# Patient Record
Sex: MALE | Race: WHITE | NOT HISPANIC OR LATINO | URBAN - METROPOLITAN AREA
[De-identification: names, ages, dates, MRNs, and addresses within clinical notes are randomized per-mention and may not be internally consistent; named-entity substitution may affect disease eponyms.]

---

## 2017-01-02 ENCOUNTER — OFFICE VISIT - RIVER FALLS (OUTPATIENT)
Dept: FAMILY MEDICINE | Facility: CLINIC | Age: 82
End: 2017-01-02

## 2017-01-02 ASSESSMENT — MIFFLIN-ST. JEOR: SCORE: 1572.34

## 2017-01-16 ENCOUNTER — OFFICE VISIT - RIVER FALLS (OUTPATIENT)
Dept: FAMILY MEDICINE | Facility: CLINIC | Age: 82
End: 2017-01-16

## 2017-01-16 ASSESSMENT — MIFFLIN-ST. JEOR: SCORE: 1559.64

## 2017-02-28 ENCOUNTER — AMBULATORY - HEALTHEAST (OUTPATIENT)
Dept: CARDIOLOGY | Facility: CLINIC | Age: 82
End: 2017-02-28

## 2017-02-28 DIAGNOSIS — Z95.0 PACEMAKER: ICD-10-CM

## 2017-03-13 ENCOUNTER — OFFICE VISIT - RIVER FALLS (OUTPATIENT)
Dept: FAMILY MEDICINE | Facility: CLINIC | Age: 82
End: 2017-03-13

## 2017-03-21 ENCOUNTER — OFFICE VISIT - RIVER FALLS (OUTPATIENT)
Dept: FAMILY MEDICINE | Facility: CLINIC | Age: 82
End: 2017-03-21

## 2017-04-24 ENCOUNTER — OFFICE VISIT - RIVER FALLS (OUTPATIENT)
Dept: FAMILY MEDICINE | Facility: CLINIC | Age: 82
End: 2017-04-24

## 2017-05-08 ENCOUNTER — OFFICE VISIT - RIVER FALLS (OUTPATIENT)
Dept: FAMILY MEDICINE | Facility: CLINIC | Age: 82
End: 2017-05-08

## 2017-05-22 ENCOUNTER — OFFICE VISIT - RIVER FALLS (OUTPATIENT)
Dept: FAMILY MEDICINE | Facility: CLINIC | Age: 82
End: 2017-05-22

## 2017-06-05 ENCOUNTER — AMBULATORY - HEALTHEAST (OUTPATIENT)
Dept: CARDIOLOGY | Facility: CLINIC | Age: 82
End: 2017-06-05

## 2017-06-05 ENCOUNTER — AMBULATORY - RIVER FALLS (OUTPATIENT)
Dept: FAMILY MEDICINE | Facility: CLINIC | Age: 82
End: 2017-06-05

## 2017-06-05 LAB — HBA1C MFR BLD: 5.7 %

## 2017-06-07 ENCOUNTER — AMBULATORY - RIVER FALLS (OUTPATIENT)
Dept: FAMILY MEDICINE | Facility: CLINIC | Age: 82
End: 2017-06-07

## 2017-06-07 ENCOUNTER — COMMUNICATION - RIVER FALLS (OUTPATIENT)
Dept: FAMILY MEDICINE | Facility: CLINIC | Age: 82
End: 2017-06-07

## 2017-06-08 ENCOUNTER — AMBULATORY - HEALTHEAST (OUTPATIENT)
Dept: CARDIOLOGY | Facility: CLINIC | Age: 82
End: 2017-06-08

## 2017-06-08 ENCOUNTER — OFFICE VISIT - HEALTHEAST (OUTPATIENT)
Dept: CARDIOLOGY | Facility: CLINIC | Age: 82
End: 2017-06-08

## 2017-06-08 DIAGNOSIS — I25.10 CORONARY ARTERY DISEASE INVOLVING NATIVE CORONARY ARTERY OF NATIVE HEART WITHOUT ANGINA PECTORIS: ICD-10-CM

## 2017-06-08 DIAGNOSIS — I48.21 PERMANENT ATRIAL FIBRILLATION (H): ICD-10-CM

## 2017-06-08 DIAGNOSIS — I48.0 PAROXYSMAL ATRIAL FIBRILLATION (H): ICD-10-CM

## 2017-06-08 DIAGNOSIS — Z95.0 CARDIAC PACEMAKER IN SITU: ICD-10-CM

## 2017-06-08 LAB
CHOLEST SERPL-MCNC: 176 MG/DL (ref 125–200)
CHOLEST/HDLC SERPL: 4.9 {RATIO}
CREAT SERPL-MCNC: 1.26 MG/DL (ref 0.7–1.11)
GLUCOSE BLD-MCNC: 104 MG/DL (ref 65–99)
HCC DEVICE COMMENTS: NORMAL
HDLC SERPL-MCNC: 36 MG/DL
LDLC SERPL CALC-MCNC: 108 MG/DL
NONHDLC SERPL-MCNC: 140 MG/DL
TRIGL SERPL-MCNC: 161 MG/DL

## 2017-06-08 ASSESSMENT — MIFFLIN-ST. JEOR: SCORE: 1568.04

## 2017-06-19 ENCOUNTER — OFFICE VISIT - RIVER FALLS (OUTPATIENT)
Dept: FAMILY MEDICINE | Facility: CLINIC | Age: 82
End: 2017-06-19

## 2017-06-19 ASSESSMENT — MIFFLIN-ST. JEOR: SCORE: 1562.36

## 2017-07-18 ENCOUNTER — OFFICE VISIT - RIVER FALLS (OUTPATIENT)
Dept: FAMILY MEDICINE | Facility: CLINIC | Age: 82
End: 2017-07-18

## 2017-07-18 ASSESSMENT — MIFFLIN-ST. JEOR: SCORE: 1555.1

## 2017-07-24 ENCOUNTER — OFFICE VISIT - RIVER FALLS (OUTPATIENT)
Dept: FAMILY MEDICINE | Facility: CLINIC | Age: 82
End: 2017-07-24

## 2017-07-24 ASSESSMENT — MIFFLIN-ST. JEOR: SCORE: 1565.08

## 2017-08-01 ENCOUNTER — OFFICE VISIT - RIVER FALLS (OUTPATIENT)
Dept: FAMILY MEDICINE | Facility: CLINIC | Age: 82
End: 2017-08-01

## 2017-08-01 ASSESSMENT — MIFFLIN-ST. JEOR: SCORE: 1540.58

## 2017-08-02 ENCOUNTER — COMMUNICATION - RIVER FALLS (OUTPATIENT)
Dept: FAMILY MEDICINE | Facility: CLINIC | Age: 82
End: 2017-08-02

## 2017-08-29 ENCOUNTER — AMBULATORY - HEALTHEAST (OUTPATIENT)
Dept: CARDIOLOGY | Facility: CLINIC | Age: 82
End: 2017-08-29

## 2017-09-11 ENCOUNTER — AMBULATORY - HEALTHEAST (OUTPATIENT)
Dept: CARDIOLOGY | Facility: CLINIC | Age: 82
End: 2017-09-11

## 2017-09-11 DIAGNOSIS — Z95.0 PACEMAKER: ICD-10-CM

## 2017-09-12 LAB — HCC DEVICE COMMENTS: NORMAL

## 2017-09-25 ENCOUNTER — COMMUNICATION - RIVER FALLS (OUTPATIENT)
Dept: FAMILY MEDICINE | Facility: CLINIC | Age: 82
End: 2017-09-25

## 2017-11-02 ENCOUNTER — OFFICE VISIT - RIVER FALLS (OUTPATIENT)
Dept: FAMILY MEDICINE | Facility: CLINIC | Age: 82
End: 2017-11-02

## 2017-11-02 ASSESSMENT — MIFFLIN-ST. JEOR: SCORE: 1554.19

## 2017-11-03 LAB
CHOLEST SERPL-MCNC: 168 MG/DL
CHOLEST/HDLC SERPL: 4.8 {RATIO}
CREAT SERPL-MCNC: 1.19 MG/DL (ref 0.7–1.11)
GLUCOSE BLD-MCNC: 170 MG/DL (ref 65–99)
HBA1C MFR BLD: 5.5 %
HDLC SERPL-MCNC: 35 MG/DL
LDLC SERPL CALC-MCNC: 97 MG/DL
NONHDLC SERPL-MCNC: 133 MG/DL
TRIGL SERPL-MCNC: 236 MG/DL

## 2017-11-20 ENCOUNTER — COMMUNICATION - RIVER FALLS (OUTPATIENT)
Dept: FAMILY MEDICINE | Facility: CLINIC | Age: 82
End: 2017-11-20

## 2017-11-21 ENCOUNTER — OFFICE VISIT - RIVER FALLS (OUTPATIENT)
Dept: FAMILY MEDICINE | Facility: CLINIC | Age: 82
End: 2017-11-21

## 2017-11-21 ASSESSMENT — MIFFLIN-ST. JEOR: SCORE: 1554.19

## 2017-12-18 ENCOUNTER — AMBULATORY - HEALTHEAST (OUTPATIENT)
Dept: CARDIOLOGY | Facility: CLINIC | Age: 82
End: 2017-12-18

## 2017-12-18 DIAGNOSIS — Z95.0 PACEMAKER: ICD-10-CM

## 2017-12-19 LAB — HCC DEVICE COMMENTS: NORMAL

## 2017-12-21 ENCOUNTER — OFFICE VISIT - RIVER FALLS (OUTPATIENT)
Dept: FAMILY MEDICINE | Facility: CLINIC | Age: 82
End: 2017-12-21

## 2018-01-01 ENCOUNTER — OFFICE VISIT - RIVER FALLS (OUTPATIENT)
Dept: FAMILY MEDICINE | Facility: CLINIC | Age: 83
End: 2018-01-01

## 2018-01-01 ENCOUNTER — AMBULATORY - HEALTHEAST (OUTPATIENT)
Dept: CARDIOLOGY | Facility: CLINIC | Age: 83
End: 2018-01-01

## 2018-01-01 DIAGNOSIS — Z95.0 CARDIAC PACEMAKER IN SITU: ICD-10-CM

## 2018-01-01 LAB
CREAT SERPL-MCNC: 1.2 MG/DL (ref 0.7–1.11)
GLUCOSE BLD-MCNC: 126 MG/DL (ref 65–99)
HBA1C MFR BLD: 5.6 %
HCC DEVICE COMMENTS: NORMAL
HCC DEVICE IMPLANTING PROVIDER: NORMAL
HCC DEVICE MANUFACTURE: NORMAL
HCC DEVICE MODEL: NORMAL
HCC DEVICE SERIAL NUMBER: NORMAL
HCC DEVICE TYPE: NORMAL

## 2018-01-01 ASSESSMENT — MIFFLIN-ST. JEOR: SCORE: 1491.6

## 2018-02-26 ENCOUNTER — AMBULATORY - RIVER FALLS (OUTPATIENT)
Dept: FAMILY MEDICINE | Facility: CLINIC | Age: 83
End: 2018-02-26

## 2018-03-27 ENCOUNTER — AMBULATORY - HEALTHEAST (OUTPATIENT)
Dept: CARDIOLOGY | Facility: CLINIC | Age: 83
End: 2018-03-27

## 2018-03-27 DIAGNOSIS — Z95.0 PACEMAKER: ICD-10-CM

## 2018-03-27 LAB — HCC DEVICE COMMENTS: NORMAL

## 2018-06-29 ENCOUNTER — OFFICE VISIT - HEALTHEAST (OUTPATIENT)
Dept: CARDIOLOGY | Facility: CLINIC | Age: 83
End: 2018-06-29

## 2018-06-29 ENCOUNTER — AMBULATORY - HEALTHEAST (OUTPATIENT)
Dept: CARDIOLOGY | Facility: CLINIC | Age: 83
End: 2018-06-29

## 2018-06-29 DIAGNOSIS — I48.21 PERMANENT ATRIAL FIBRILLATION (H): ICD-10-CM

## 2018-06-29 DIAGNOSIS — Z95.0 CARDIAC PACEMAKER IN SITU: ICD-10-CM

## 2018-06-29 DIAGNOSIS — I25.10 CORONARY ARTERY DISEASE INVOLVING NATIVE CORONARY ARTERY OF NATIVE HEART WITHOUT ANGINA PECTORIS: ICD-10-CM

## 2018-06-29 LAB
HCC DEVICE COMMENTS: NORMAL
HCC DEVICE IMPLANTING PROVIDER: NORMAL
HCC DEVICE MANUFACTURE: NORMAL
HCC DEVICE MODEL: NORMAL
HCC DEVICE SERIAL NUMBER: NORMAL
HCC DEVICE TYPE: NORMAL

## 2018-06-29 RX ORDER — TAMSULOSIN HYDROCHLORIDE 0.4 MG/1
0.4 CAPSULE ORAL
Status: SHIPPED | COMMUNITY
Start: 2018-06-29

## 2019-01-01 ENCOUNTER — COMMUNICATION - RIVER FALLS (OUTPATIENT)
Dept: FAMILY MEDICINE | Facility: CLINIC | Age: 84
End: 2019-01-01

## 2019-01-01 ENCOUNTER — AMBULATORY - HEALTHEAST (OUTPATIENT)
Dept: CARDIOLOGY | Facility: CLINIC | Age: 84
End: 2019-01-01

## 2019-01-01 ENCOUNTER — OFFICE VISIT - HEALTHEAST (OUTPATIENT)
Dept: CARDIOLOGY | Facility: CLINIC | Age: 84
End: 2019-01-01

## 2019-01-01 ENCOUNTER — OFFICE VISIT - RIVER FALLS (OUTPATIENT)
Dept: FAMILY MEDICINE | Facility: CLINIC | Age: 84
End: 2019-01-01

## 2019-01-01 DIAGNOSIS — I48.21 PERMANENT ATRIAL FIBRILLATION (H): ICD-10-CM

## 2019-01-01 DIAGNOSIS — Z95.0 CARDIAC PACEMAKER IN SITU: ICD-10-CM

## 2019-01-01 DIAGNOSIS — I25.10 CORONARY ARTERY DISEASE INVOLVING NATIVE CORONARY ARTERY OF NATIVE HEART WITHOUT ANGINA PECTORIS: ICD-10-CM

## 2019-01-01 LAB
ALBUMIN SERPL-MCNC: 3.1 G/DL
ALBUMIN SERPL-MCNC: 3.1 G/DL
ALP SERPL-CCNC: 102 UNIT/L
ALP SERPL-CCNC: 106 UNIT/L
ALT SERPL W P-5'-P-CCNC: 14 UNIT/L
ALT SERPL W P-5'-P-CCNC: 14 UNIT/L
AST SERPL W P-5'-P-CCNC: 32 UNIT/L
AST SERPL W P-5'-P-CCNC: 38 UNIT/L
BILIRUB SERPL-MCNC: 0.4 MG/DL
BILIRUB SERPL-MCNC: 0.5 MG/DL
BUN SERPL-MCNC: 20 MG/DL
CALCIUM SERPL-MCNC: 9.4 MEQ/DL
CALCIUM SERPL-MCNC: 9.5 MEQ/DL
CREAT SERPL-MCNC: 1 MG/DL
CREAT SERPL-MCNC: 1.2 MG/DL
FERRITIN SERPL-MCNC: 601.9 NG/ML
GLUCOSE BLD-MCNC: 139 MG/DL
GLUCOSE BLD-MCNC: 96 MG/DL
HBA1C MFR BLD: 5.6 %
HCC DEVICE COMMENTS: NORMAL
HCC DEVICE IMPLANTING PROVIDER: NORMAL
HCC DEVICE MANUFACTURE: NORMAL
HCC DEVICE MODEL: NORMAL
HCC DEVICE SERIAL NUMBER: NORMAL
HCC DEVICE TYPE: NORMAL
HCT VFR BLD AUTO: 31.5 %
HCT VFR BLD AUTO: 35.5 %
HGB BLD-MCNC: 10.7 G/DL
HGB BLD-MCNC: 12.1 G/DL
IRON: 74 NMOL/L
MAGNESIUM SERPL-MCNC: 2 MG/DL
MAGNESIUM SERPL-MCNC: 2.1 MG/DL
PLATELET # BLD AUTO: 218 X10
PLATELET # BLD AUTO: 227 X10
POTASSIUM BLD-SCNC: 4 MEQ/L
POTASSIUM BLD-SCNC: 4.1 MEQ/L
PROT SERPL-MCNC: 6.6 GM/DL
PROT SERPL-MCNC: 7.2 GM/DL
SODIUM SERPL-SCNC: 139 MEQ/L
SODIUM SERPL-SCNC: 139 MEQ/L
TIBC - QUEST: 215 MG/DL
TRANSFERRIN: 172 %
TSH SERPL DL<=0.005 MIU/L-ACNC: 1.8 MIU/L
TSH SERPL DL<=0.005 MIU/L-ACNC: 1.93 MIU/L
WBC # BLD AUTO: 5.91 X10
WBC # BLD AUTO: 6.19 X10

## 2019-01-01 RX ORDER — MORPHINE SULFATE 15 MG/1
15 TABLET ORAL EVERY 4 HOURS PRN
Status: SHIPPED | COMMUNITY
Start: 2019-01-01

## 2019-01-01 RX ORDER — PREDNISOLONE SODIUM PHOSPHATE 15 MG/1
15 TABLET, ORALLY DISINTEGRATING ORAL DAILY
Status: SHIPPED | COMMUNITY
Start: 2019-01-01

## 2019-01-01 RX ORDER — FINASTERIDE 5 MG/1
5 TABLET, FILM COATED ORAL DAILY
Status: SHIPPED | COMMUNITY
Start: 2019-01-01

## 2019-01-01 ASSESSMENT — MIFFLIN-ST. JEOR
SCORE: 1395.67
SCORE: 1396.34

## 2021-05-29 NOTE — PROGRESS NOTES
In clinic device check with Dr. Garcia.  Please see link for full device report.  Patient was informed of results and next follow up during today's visit.

## 2021-05-29 NOTE — PATIENT INSTRUCTIONS - HE
Frank Hylton,    It was a pleasure to see you today at the [facility].     My recommendations after this visit include:    1.  Please stop the hydrochlorothiazide.    2.  Please call if we can do anything to help.    3.  Bless you both!  It has been my honor to care for you over the last several years.        Boni Garcia

## 2021-05-31 VITALS — WEIGHT: 206 LBS | HEIGHT: 67 IN | BODY MASS INDEX: 32.33 KG/M2

## 2021-06-01 VITALS — BODY MASS INDEX: 30.79 KG/M2 | WEIGHT: 196.6 LBS

## 2021-06-03 VITALS — BODY MASS INDEX: 26.37 KG/M2 | HEIGHT: 67 IN | WEIGHT: 168 LBS

## 2021-06-11 NOTE — PROGRESS NOTES
In clinic device check with Device RN and follow-up with Dr. Boni Garcia..  Please see link for full device report.  Patient was informed of results and next follow up during today's visit.

## 2021-06-16 PROBLEM — Z95.0 CARDIAC PACEMAKER IN SITU: Status: ACTIVE | Noted: 2017-06-08

## 2021-06-16 PROBLEM — I48.21 PERMANENT ATRIAL FIBRILLATION (H): Status: ACTIVE | Noted: 2017-06-08

## 2021-06-18 NOTE — LETTER
Letter by Jocelyne Victor EPS at      Author: Jocelyne Victor EPS Service: -- Author Type: --    Filed:  Encounter Date: 1/17/2019 Status: (Other)       Frank Hylton  [address] [address]      January 17, 2019      Dear Mr. Hylton,    RE: Remote Results    We are writing to you regarding your recent Remote Pacemaker check from home. Your transmission was received successfully. Battery status is satisfactory at this time.     Your results are within normal limits.    Your next device appointment will be a remote check on April 22, 2019.  You can choose the time of day you wish to transmit.    To schedule or reschedule, please call [phone] and press 1.    NOTE: If you would like to do an extra transmission, please call [phone] and press 3 to speak to a nurse BEFORE transmitting. This ensures that the Device Clinic staff is aware of the reason you are sending a transmission, and can follow-up with you after it has been reviewed.    We will be checking your implanted device from home (remotely) every three months unless otherwise instructed. We will need to see you in the clinic at least once a year. You may need to be seen in the clinic sooner depending on the results of your check.    Please be aware:    The follow-up schedule is like a Physician prescription.    Your remote monitor is paired to your specific implanted device.      Sincerely,    [facility]

## 2021-06-19 NOTE — LETTER
Letter by Shea Miranda at      Author: Shea Miranda Service: -- Author Type: --    Filed:  Encounter Date: 4/23/2019 Status: (Other)         Frank Hylton  [address] [address]      April 23, 2019      Dear Mr. Hylton,    RE: Remote Results    We are writing to you regarding your recent Remote Pacemaker check from home. Your transmission was received successfully. Battery status is satisfactory at this time.     Your results are within normal limits.    Your next device appointment will be a clinic visit on June 21, 2019 at 2:20pm at our  [place] location, [address] [place] [address].    To schedule or reschedule, please call [phone] and press 1.    NOTE: If you would like to do an extra transmission, please call [phone] and press 3 to speak to a nurse BEFORE transmitting. This ensures that the Device Clinic staff is aware of the reason you are sending a transmission, and can follow-up with you after it has been reviewed.    We will be checking your implanted device from home (remotely) every three months unless otherwise instructed. We will need to see you in the clinic at least once a year. You may need to be seen in the clinic sooner depending on the results of your check.    Please be aware:    The follow-up schedule is like a Physician prescription.    Your remote monitor is paired to your specific implanted device.      Sincerely,    [facility]

## 2021-06-25 NOTE — PROGRESS NOTES
Progress Notes by Boni Garcia MD at 6/8/2017  3:30 PM     Author: Boni Garcia MD Service: -- Author Type: Physician    Filed: 6/8/2017  3:58 PM Encounter Date: 6/8/2017 Status: Signed    : Boni Garcia MD (Physician)           Click to link to [facility] HEART CARE NOTE    Thank you, Dr. Fair, for asking us to see Frank Hylton at the [facility].      Assessment/Recommendations   Patient with known coronary artery disease and permanent atrial fibrillation.  His cardiac status is stable and he does not have any evidence of pulmonary vascular congestion is not having any anginal symptoms and his blood pressure is well controlled.    I have not made any changes in his current medical regimen.  We will continue to follow him on an annual basis but of course be happy to see him sooner if questions or problems arise.         History of Present Illness    Mr. Frank Hylton is a 83 y.o. male with known coronary artery disease who is been having more trouble with spinal stenosis.  He also has significant cognitive issues and paroxysmal atrial fibrillation which is been noted on his pacemaker checks.  He has had a couple of episodes the last one which was in January.    From a cardiac standpoint he is actually feeling reasonably well.  He really cannot walk very far at all because of severe leg weakness and balance issues.  He does use a walker.  Does not have pain in his legs.  They visited with the surgeon about spinal stenosis but because there is no pain they do not really think that they can improve on his current situation.  He denies unusual shortness of breath with minimal activity and his wife denies orthopnea or paroxysmal nocturnal dyspnea or that he complains of chest discomfort.  His wife really takes care of him.  He has been in the hospital a couple times with what they describe his episodes.  He speaks with some garbled speech  and seems like he does not know what is going on as well although he has baseline cognitive dysfunction.  He has been brought in his heart is been checked and no abnormalities were noted then.  She wonders if it could be related in part to some dehydration.  He does take warfarin and baby aspirin.  She reports that his INRs have been therapeutic.    Device check showed 3 ventricular high rates last of which was in January 18 of 2017 and he does have permanent atrial fibrillation.  His device check showed that the leads were stable and he has 7-1/2 years remaining on his battery.  Underlying rhythm is A. fib with a ventricular response in the 40s-60s.         Physical Examination Review of Systems   Vitals:    06/08/17 1443   BP: 124/66   Pulse: 60   Resp: 18   SpO2: 97%     Body mass index is 32.26 kg/(m^2).  Wt Readings from Last 3 Encounters:   06/08/17 206 lb (93.4 kg)   05/31/16 208 lb (94.3 kg)   05/06/15 201 lb (91.2 kg)     General Appearance:   Alert, cooperative and in no acute distress.   ENT/Mouth: Oral mucuos membranes pink and moist .      EYES:  No scleral icterus. No Xanthelasma.    Neck: JVP normal. No Hepatojugular reflux. Thyroid not visualized   Chest/Lungs:   Lungs are clear to auscultation, equal chest wall expansion    Cardiovascular:   S1, S2 without murmur ,clicks or rubs. Brachial, radial  pulses are intact and symetric. No carotid bruits noted   Abdomen:  Nontender. BS+. No bruits.      Extremities: No cyanosis, clubbing or edema   Skin: no xanthelasma, warm.    Psych: Appropriate affect.   Neurologic:  unsteady, even with help while walking., normal  bilateral, no tremors        General: WNL  Eyes: WNL  Ears/Nose/Throat: WNL  Lungs: WNL  Heart: WNL  Stomach: WNL  Bladder: Frequent Urination at Night  Muscle/Joints: WNL  Skin: WNL  Nervous System: Daytime Sleepiness, Dizziness, Loss of Balance  Mental Health: Confusion     Blood: WNL     Medical History  Surgical History Family History  Social History   No past medical history on file. No past surgical history on file. No family history on file. Social History     Social History   ? Marital status:      Spouse name: N/A   ? Number of children: N/A   ? Years of education: N/A     Occupational History   ? Not on file.     Social History Main Topics   ? Smoking status: Former Smoker     Quit date: 5/6/1960   ? Smokeless tobacco: Not on file   ? Alcohol use Not on file   ? Drug use: Not on file   ? Sexual activity: Not on file     Other Topics Concern   ? Not on file     Social History Narrative          Medications  Allergies   Current Outpatient Prescriptions   Medication Sig Dispense Refill   ? aspirin 81 MG EC tablet Take 81 mg by mouth daily.     ? cyanocobalamin, vitamin B-12, 1,000 mcg Subl Take as directed.     ? hydrochlorothiazide (HYDRODIURIL) 25 MG tablet Take 12.5 mg by mouth daily.     ? metFORMIN (GLUCOPHAGE) 500 MG tablet Take 500 mg by mouth 2 (two) times a day with meals.     ? metoprolol (LOPRESSOR) 25 MG tablet Take tablet. Dose unknown.     ? warfarin (COUMADIN) 3 MG tablet 3 mg. 3.5mg daily     ? acetaminophen (TYLENOL) 325 MG tablet Take 325-600 mg by mouth every 4 (four) hours as needed.     ? amLODIPine (NORVASC) 5 MG tablet Take 5 mg by mouth daily. As directed.     ? atorvastatin (LIPITOR) 40 MG tablet Take 40 mg by mouth daily.     ? metoprolol (LOPRESSOR) 50 MG tablet Take 50 mg by mouth daily.     ? nitroglycerin (NITROSTAT) 0.4 MG SL tablet Place 0.4 mg under the tongue every 5 (five) minutes as needed for chest pain. For up to 3 doses. Call 911 if pain persists.       No current facility-administered medications for this visit.       No Known Allergies      Lab Results    Chemistry/lipid CBC Cardiac Enzymes/BNP/TSH/INR   Lab Results   Component Value Date    CREATININE 1.19 03/26/2014    BUN 17 03/26/2014    K 3.8 03/26/2014     (L) 03/26/2014     03/26/2014    CO2 21 (L) 03/26/2014    Lab Results    Component Value Date    WBC 7.8 03/26/2014    HGB 13.0 (L) 03/26/2014    HCT 36.7 (L) 03/26/2014    MCV 94 03/26/2014     (L) 03/26/2014    Lab Results   Component Value Date    CKMB 2 03/22/2014    TROPONINI 0.03 03/23/2014    INR 1.13 (H) 03/26/2014

## 2021-06-26 NOTE — PROGRESS NOTES
Progress Notes by Boni Garcia MD at 6/29/2018 11:10 AM     Author: Boni Garcia MD Service: -- Author Type: Physician    Filed: 6/29/2018 11:53 AM Encounter Date: 6/29/2018 Status: Signed    : Boni Garcia MD (Physician)           Click to link to [facility] HEART CARE NOTE    Thank you, Dr. Fair, for asking us to see Frank Hylton at the [facility].      Assessment/Recommendations   Patient with known permanent atrial fibrillation was appropriately anticoagulated.  He does not have any signs or symptoms of congestive heart failure and is not having anginal symptoms.  I have not changing of his medications today.  I recommended that he maintain an active lifestyle to the degree possible.    We will see him back in 1 year, but of course would be happy to see him sooner if questions or problems arise.         History of Present Illness    Mr. Frank Hylton is a 84 y.o. male with known coronary artery disease and permanent atrial fibrillation.  He does have some significant cognitive issues and his spouse reports that he is a bit more fatigued, less active, and sleeps more.  He does not complain of shortness of breath, orthopnea, paroxysmal nocturnal dyspnea or even peripheral edema.  He has not had syncopal or near syncopal episodes and he does not complain of any chest discomfort.  His device check today was unremarkable.  He is in persistent atrial fibrillation his histogram show heart rates in the  range and is 10% RV paced.         Physical Examination Review of Systems   Vitals:    06/29/18 1029   BP: 120/76   Pulse: (!) 50   Resp: 18     Body mass index is 30.79 kg/(m^2).  Wt Readings from Last 3 Encounters:   06/29/18 196 lb 9.6 oz (89.2 kg)   06/08/17 206 lb (93.4 kg)   05/31/16 208 lb (94.3 kg)     General Appearance:   Alert, cooperative and in no acute distress.   ENT/Mouth: Oral mucuos membranes pink and moist .       EYES:  No scleral icterus. No Xanthelasma.    Neck: JVP normal. No Hepatojugular reflux. Thyroid not visualized   Chest/Lungs:   Lungs are clear to auscultation, equal chest wall expansion    Cardiovascular:   S1, S2 without murmur ,clicks or rubs. Brachial, radial  pulses are intact and symetric. No carotid bruits noted   Abdomen:  Nontender. BS+.       Extremities: No cyanosis, clubbing or edema   Skin: no xanthelasma, warm.    Psych: Appropriate affect.   Neurologic:  Slow and unsteady gait and uses a walker, normal  bilateral, no tremors                                                  Medical History  Surgical History Family History Social History   No past medical history on file. No past surgical history on file. No family history on file. Social History     Social History   ? Marital status:      Spouse name: N/A   ? Number of children: N/A   ? Years of education: N/A     Occupational History   ? Not on file.     Social History Main Topics   ? Smoking status: Former Smoker     Quit date: 5/6/1960   ? Smokeless tobacco: Not on file   ? Alcohol use Not on file   ? Drug use: Not on file   ? Sexual activity: Not on file     Other Topics Concern   ? Not on file     Social History Narrative          Medications  Allergies   Current Outpatient Prescriptions   Medication Sig Dispense Refill   ? acetaminophen (TYLENOL) 325 MG tablet Take 325-600 mg by mouth every 4 (four) hours as needed.     ? aspirin 81 MG EC tablet Take 81 mg by mouth daily.     ? atorvastatin (LIPITOR) 40 MG tablet Take 40 mg by mouth daily.     ? cyanocobalamin, vitamin B-12, 1,000 mcg Subl Take as directed.     ? hydrochlorothiazide (HYDRODIURIL) 25 MG tablet Take 12.5 mg by mouth daily.     ? metFORMIN (GLUCOPHAGE) 500 MG tablet Take 500 mg by mouth 2 (two) times a day with meals.     ? metoprolol (LOPRESSOR) 25 MG tablet Take 25 mg by mouth 2 (two) times a day. Take tablet. Dose unknown.      ? multivitamin therapeutic tablet  Take 1 tablet by mouth daily.     ? tamsulosin (FLOMAX) 0.4 mg Cp24 Take 0.4 mg by mouth.     ? warfarin (COUMADIN) 3 MG tablet 3 mg. 3.5mg daily     ? amLODIPine (NORVASC) 5 MG tablet Take 5 mg by mouth daily. As directed.     ? nitroglycerin (NITROSTAT) 0.4 MG SL tablet Place 0.4 mg under the tongue every 5 (five) minutes as needed for chest pain. For up to 3 doses. Call 911 if pain persists.       No current facility-administered medications for this visit.       No Known Allergies      Lab Results    Chemistry/lipid CBC Cardiac Enzymes/BNP/TSH/INR   Lab Results   Component Value Date    CREATININE 1.19 03/26/2014    BUN 17 03/26/2014    K 3.8 03/26/2014     (L) 03/26/2014     03/26/2014    CO2 21 (L) 03/26/2014    Lab Results   Component Value Date    WBC 7.8 03/26/2014    HGB 13.0 (L) 03/26/2014    HCT 36.7 (L) 03/26/2014    MCV 94 03/26/2014     (L) 03/26/2014    Lab Results   Component Value Date    CKMB 2 03/22/2014    TROPONINI 0.03 03/23/2014    INR 1.13 (H) 03/26/2014

## 2021-06-27 NOTE — PROGRESS NOTES
Progress Notes by Boni Garcia MD at 6/21/2019  3:10 PM     Author: Boni Garcia MD Service: -- Author Type: Physician    Filed: 6/21/2019  3:13 PM Encounter Date: 6/21/2019 Status: Signed    : Boni Garcia MD (Physician)           Click to link to [facility] HEART CARE NOTE    Thank you, Dr. Fair, for asking us to see Frank Hylton at the [facility].      Assessment/Recommendations   Patient with widely metastatic cancer, now on comfort care.  I think we could discontinue his hydrochlorothiazide as he is not eating very well and likely will get dehydrated.    I would continue his anticoagulant.  This could certainly be stopped by his primary care physician as well given his situation.    I am happy to see him on as-needed basis.    It has been my honor to take care of Mr. Hylton these years.         History of Present Illness    Mr. Frank Hylton is a 85 y.o. male with known coronary artery disease as well as permanent atrial fibrillation.  He has been appropriately anticoagulated.  He was recently diagnosed with widely metastatic malignancy in the bones and elsewhere.  He has been having a lot of pain.  With his cognitive function he is not aware of this yet but he is getting pain medications.  He has not had any unusual shortness of breath and is not complained of chest discomfort.  His device check today showed 8.3% ventricular pacing.         Physical Examination Review of Systems   Vitals:    06/21/19 1419   BP: 108/58   Pulse: 73   Resp: 14     Body mass index is 26.31 kg/m .  Wt Readings from Last 3 Encounters:   06/21/19 168 lb (76.2 kg)   06/29/18 196 lb 9.6 oz (89.2 kg)   06/08/17 206 lb (93.4 kg)     General Appearance:   Alert, cooperative and in no acute distress.   ENT/Mouth: Oral mucuos membranes pink and moist .      EYES:  No scleral icterus. No Xanthelasma.    Neck: JVP normal.  Thyroid not visualized    Chest/Lungs:   Lungs are clear to auscultation, equal chest wall expansion    Cardiovascular:   S1, S2 with 1/6 systolic murmur , no clicks or rubs. Brachial, radial  pulses are intact and symetric.            Skin: no xanthelasma, warm.    Psych:  Confused.   Neurologic:  normal  bilateral, no tremors        General: WNL  Eyes: WNL  Ears/Nose/Throat: WNL  Lungs: WNL  Heart: WNL  Stomach: WNL  Bladder: WNL  Muscle/Joints: WNL  Skin: WNL  Nervous System: WNL  Mental Health: WNL     Blood: WNL     Medical History  Surgical History Family History Social History   No past medical history on file. No past surgical history on file. No family history on file. Social History     Socioeconomic History   ? Marital status:      Spouse name: Not on file   ? Number of children: Not on file   ? Years of education: Not on file   ? Highest education level: Not on file   Occupational History   ? Not on file   Social Needs   ? Financial resource strain: Not on file   ? Food insecurity:     Worry: Not on file     Inability: Not on file   ? Transportation needs:     Medical: Not on file     Non-medical: Not on file   Tobacco Use   ? Smoking status: Former Smoker     Last attempt to quit: 1960     Years since quittin.1   Substance and Sexual Activity   ? Alcohol use: Not on file   ? Drug use: Not on file   ? Sexual activity: Not on file   Lifestyle   ? Physical activity:     Days per week: Not on file     Minutes per session: Not on file   ? Stress: Not on file   Relationships   ? Social connections:     Talks on phone: Not on file     Gets together: Not on file     Attends Gnosticist service: Not on file     Active member of club or organization: Not on file     Attends meetings of clubs or organizations: Not on file     Relationship status: Not on file   ? Intimate partner violence:     Fear of current or ex partner: Not on file     Emotionally abused: Not on file     Physically abused: Not on file     Forced  sexual activity: Not on file   Other Topics Concern   ? Not on file   Social History Narrative   ? Not on file          Medications  Allergies   Current Outpatient Medications   Medication Sig Dispense Refill   ? aspirin 81 MG EC tablet Take 81 mg by mouth daily.     ? cyanocobalamin, vitamin B-12, 1,000 mcg Subl 500 mcg. Take as directed.            ? finasteride (PROSCAR) 5 mg tablet Take 5 mg by mouth daily.     ? metFORMIN (GLUCOPHAGE) 500 MG tablet Take 500 mg by mouth 2 (two) times a day with meals.     ? metoprolol (LOPRESSOR) 25 MG tablet Take 25 mg by mouth 2 (two) times a day. Take tablet. Dose unknown.      ? morphine (MSIR) 15 MG tablet Take 15 mg by mouth every 4 (four) hours as needed for pain.     ? multivitamin therapeutic tablet Take 1 tablet by mouth daily.     ? nitroglycerin (NITROSTAT) 0.4 MG SL tablet Place 0.4 mg under the tongue every 5 (five) minutes as needed for chest pain. For up to 3 doses. Call 911 if pain persists.     ? prednisoLONE (ORAPRED ODT) 15 MG disintegrating tablet Take 15 mg by mouth daily.     ? tamsulosin (FLOMAX) 0.4 mg Cp24 Take 0.4 mg by mouth.     ? warfarin (COUMADIN) 3 MG tablet 3 mg. 3.5mg daily       No current facility-administered medications for this visit.       No Known Allergies      Lab Results    Chemistry/lipid CBC Cardiac Enzymes/BNP/TSH/INR   Lab Results   Component Value Date    CREATININE 1.19 03/26/2014    BUN 17 03/26/2014    K 3.8 03/26/2014     (L) 03/26/2014     03/26/2014    CO2 21 (L) 03/26/2014    Lab Results   Component Value Date    WBC 7.8 03/26/2014    HGB 13.0 (L) 03/26/2014    HCT 36.7 (L) 03/26/2014    MCV 94 03/26/2014     (L) 03/26/2014    Lab Results   Component Value Date    CKMB 2 03/22/2014    TROPONINI 0.03 03/23/2014    INR 1.13 (H) 03/26/2014

## 2021-08-03 PROBLEM — I48.0 PAROXYSMAL ATRIAL FIBRILLATION (H): Status: RESOLVED | Noted: 2017-06-08 | Resolved: 2017-06-08

## 2022-02-11 VITALS
WEIGHT: 206 LBS | HEART RATE: 68 BPM | SYSTOLIC BLOOD PRESSURE: 122 MMHG | DIASTOLIC BLOOD PRESSURE: 74 MMHG | HEART RATE: 68 BPM | DIASTOLIC BLOOD PRESSURE: 74 MMHG | HEIGHT: 67 IN | SYSTOLIC BLOOD PRESSURE: 126 MMHG | BODY MASS INDEX: 32.58 KG/M2 | HEIGHT: 67 IN | HEART RATE: 56 BPM | HEIGHT: 67 IN | WEIGHT: 208.2 LBS | WEIGHT: 202.8 LBS | BODY MASS INDEX: 32.68 KG/M2 | HEIGHT: 67 IN | SYSTOLIC BLOOD PRESSURE: 106 MMHG | BODY MASS INDEX: 31.83 KG/M2 | SYSTOLIC BLOOD PRESSURE: 132 MMHG | DIASTOLIC BLOOD PRESSURE: 72 MMHG | DIASTOLIC BLOOD PRESSURE: 72 MMHG | HEART RATE: 64 BPM | HEART RATE: 72 BPM | BODY MASS INDEX: 32.33 KG/M2 | TEMPERATURE: 97.8 F | DIASTOLIC BLOOD PRESSURE: 78 MMHG | SYSTOLIC BLOOD PRESSURE: 130 MMHG | WEIGHT: 207.6 LBS

## 2022-02-11 VITALS
BODY MASS INDEX: 31.98 KG/M2 | SYSTOLIC BLOOD PRESSURE: 124 MMHG | SYSTOLIC BLOOD PRESSURE: 122 MMHG | DIASTOLIC BLOOD PRESSURE: 70 MMHG | SYSTOLIC BLOOD PRESSURE: 132 MMHG | DIASTOLIC BLOOD PRESSURE: 72 MMHG | HEART RATE: 60 BPM | BODY MASS INDEX: 32.86 KG/M2 | DIASTOLIC BLOOD PRESSURE: 70 MMHG | SYSTOLIC BLOOD PRESSURE: 136 MMHG | HEART RATE: 52 BPM | DIASTOLIC BLOOD PRESSURE: 70 MMHG | DIASTOLIC BLOOD PRESSURE: 82 MMHG | WEIGHT: 209.8 LBS | WEIGHT: 204.2 LBS | SYSTOLIC BLOOD PRESSURE: 126 MMHG | HEART RATE: 72 BPM | HEART RATE: 61 BPM | HEART RATE: 72 BPM

## 2022-02-11 VITALS
SYSTOLIC BLOOD PRESSURE: 140 MMHG | BODY MASS INDEX: 32.93 KG/M2 | WEIGHT: 209.8 LBS | HEIGHT: 67 IN | DIASTOLIC BLOOD PRESSURE: 72 MMHG | TEMPERATURE: 97.3 F | HEART RATE: 68 BPM | HEIGHT: 67 IN | SYSTOLIC BLOOD PRESSURE: 128 MMHG | HEART RATE: 64 BPM | BODY MASS INDEX: 32.49 KG/M2 | WEIGHT: 207 LBS | DIASTOLIC BLOOD PRESSURE: 68 MMHG

## 2022-02-11 VITALS
HEART RATE: 54 BPM | SYSTOLIC BLOOD PRESSURE: 118 MMHG | DIASTOLIC BLOOD PRESSURE: 62 MMHG | WEIGHT: 192 LBS | HEIGHT: 67 IN | OXYGEN SATURATION: 97 % | BODY MASS INDEX: 30.13 KG/M2

## 2022-02-11 VITALS
HEART RATE: 75 BPM | DIASTOLIC BLOOD PRESSURE: 82 MMHG | HEART RATE: 57 BPM | SYSTOLIC BLOOD PRESSURE: 134 MMHG | OXYGEN SATURATION: 98 % | DIASTOLIC BLOOD PRESSURE: 80 MMHG | SYSTOLIC BLOOD PRESSURE: 126 MMHG

## 2022-02-11 VITALS
HEIGHT: 67 IN | HEART RATE: 54 BPM | DIASTOLIC BLOOD PRESSURE: 78 MMHG | OXYGEN SATURATION: 97 % | SYSTOLIC BLOOD PRESSURE: 128 MMHG | WEIGHT: 171 LBS | BODY MASS INDEX: 26.84 KG/M2

## 2022-02-11 VITALS
DIASTOLIC BLOOD PRESSURE: 66 MMHG | HEIGHT: 67 IN | HEART RATE: 63 BPM | SYSTOLIC BLOOD PRESSURE: 124 MMHG | WEIGHT: 205.8 LBS | DIASTOLIC BLOOD PRESSURE: 78 MMHG | OXYGEN SATURATION: 97 % | WEIGHT: 205.8 LBS | HEART RATE: 55 BPM | HEIGHT: 67 IN | BODY MASS INDEX: 32.3 KG/M2 | SYSTOLIC BLOOD PRESSURE: 112 MMHG | HEART RATE: 73 BPM | SYSTOLIC BLOOD PRESSURE: 120 MMHG | BODY MASS INDEX: 32.3 KG/M2 | DIASTOLIC BLOOD PRESSURE: 84 MMHG

## 2022-02-16 NOTE — CARE COORDINATION
Pt appears on [facility] chronic disease panel as out of parameters for A1C date  Pt was seen 11/26/18, A1C was 5.6, placed RTC 5/2019 CDV/A1C.

## 2022-02-16 NOTE — PROGRESS NOTES
Patient:   TREVOR DWYER            MRN: [id number]            FIN: [id number]               Age:   83 years     Sex:  Male     :  1934   Associated Diagnoses:   Acute gout   Author:   Chaz Fair MD      Impression and Plan   Diagnosis     Acute gout (PCK35-PY M10.9).     Course:  Resolved.    Orders     Orders   Charges (Evaluation and Management):  65154 office outpatient visit 15 minutes (Charge) (Order): Quantity: 1, Acute gout.        Visit Information   Visit type:  New symptom.    Accompanied by:  Spouse.    Source of history:  Self, Spouse.    History limitation:  None.       Chief Complaint   2017 10:26 AM CDT   Pt. [facility] work, [facility]: [facility] to Marv.        History of Present Illness             The patient presents with bilateral large toe pain caused by inflammation of the first MTP joint - This has improved dramatically over the past week on Prednisone..  Exacerbating factors consist of movement and walking.  Relieving factors consist of medication.  Associated symptoms consist of gait disturbance.  Additional pertinent history: none.        Review of Systems   Constitutional:  Negative.    Eye:  Negative.    Ear/Nose/Mouth/Throat:  Negative.    Respiratory:  Negative.    Cardiovascular:  Negative.    Gastrointestinal:  Negative.    Genitourinary:  Negative.    Hematology/Lymphatics:  Negative.    Endocrine:  Negative.    Immunologic:  Negative.    Musculoskeletal:  Negative.    Integumentary:  Negative except as documented in history of present illness.    Neurologic:  Negative.    Psychiatric:  Negative.    All other systems reviewed and negative      Health Status   Allergies:    Allergic Reactions (Selected)  Severity Not Documented  Lisinopril (Elevated creatinine)  Nonallergic Reactions (Selected)  Severity Not Documented  Aricept (Ill)   Medications:  (Selected)   Prescriptions  Prescribed  Metoprolol Tartrate 25 mg oral tablet: ( 12.5 mg ), po, bid, # 90  tab(s), 1 Refill(s), Type: Maintenance  Miscellaneous Rx Supply: 1 Touch Ultra test Strips, See Instructions, Instructions: BID, Supply, # 180 EA, 1 Refill(s), Type: Maintenance  One Touch Ultra Mini Meter: One Touch Ultra Mini Meter, See Instructions, Instructions: Use as directed., Supply, # 1 kit(s), 0 Refill(s), Type: Maintenance  Vitamin B12 1000 mcg oral tablet: ( 1,000 mcg ), po, daily, # 90 tab(s), 1 Refill(s), Type: Maintenance  aspirin 81 mg oral tablet: 1 tab(s) ( 81 mg ), po, daily, # 90 tab(s), 1 Refill(s), Type: Maintenance, faxed to pharmacy (Rx), [facility]; fax# [phone] '; phonw # [phone]  hydroCHLOROthiazide 25 mg oral tablet: 0.5 tab(s) ( 12.5 mg ), PO, Daily, # 45 tab(s), 1 Refill(s), Type: Maintenance  metFORMIN 500 mg oral tablet, extended release: 1 tab(s) ( 500 mg ), PO, bid, # 180 tab(s), 1 Refill(s), Type: Maintenance  nitroglycerin 0.4 mg sublingual tablet: See Instructions, Instructions: 1 tab(s) SL q5min  (not to exceed 3 doses/15 min--if pain persists, seek medical attention), PRN:  for chest pain, # 25 tab(s), 1 Refill(s), Type: Maintenance  predniSONE 20 mg oral tablet: 1 tab(s) ( 20 mg ), PO, bid, # 14 tab(s), 0 Refill(s), Type: Maintenance, Pharmacy: [facility], 1 tab(s) po bid,x7 day(s)  warfarin 3 mg oral tablet: See Instructions, Instructions: Take one (1) tablet daily as directed by physician, # 60 EA, 5 Refill(s), Type: Maintenance, Pharmacy: [facility], Take one (1) tablet daily as directed by physician  Documented Medications  Documented  Multi-vitamin: Multi-vitamin, See Instructions, Supply, 0 Refill(s), Type: Maintenance  warfarin 1 mg oral tablet: 1 tab(s) ( 1 mg ), PO, Daily, # 90 tab(s), 0 Refill(s), Type: Maintenance   Problem list:    All Problems  Acute gout / SNOMED CT 613711475 / Confirmed  ATRIAL FIBRILLATION / SNOMED CT 7111482452 / Confirmed  CAD (Coronary Artery Disease) / ICD-9-.00 / Confirmed  Cardiac pacemaker in  situ / SNOMED CT 5893354353 / Confirmed  Dementia / SNOMED CT 361544008857729 / Confirmed  Diabetes Mellitus / ICD-9- / Confirmed  Dyslipidemia / ICD-9-.4 / Confirmed  History of fracture of rib / SNOMED CT 5101618459 / Confirmed  HTN (hypertension) / SNOMED CT 3013515495 / Confirmed  Memory Loss or Impairment / ICD-9-.93 / Confirmed  MI (Myocardial Infarction) / ICD-9-.90 / Confirmed  Obesity / ICD-9-.00 / Probable  Onychomycosis / SNOMED CT 2288751862 / Confirmed  JAZLYN (Obstructive Sleep Apnea) / ICD-9-.23 / Confirmed  Transient ischemic attack (TIA) / SNOMED CT 130254044 / Confirmed  Resolved: Inpatient stay / SNOMED CT 100486668  Resolved: Inpatient stay / SNOMED CT 213385365  Resolved: Inpatient stay / SNOMED CT 573986670      Histories   Past Medical History:    Active  Cardiac pacemaker in situ (9122762583): Onset in 2008 at 74 years.  History of fracture of rib (1252425690): Onset in the month of 10/1997 at 63 years  Comments:  6/21/2016 CDT 9:44 AM CDT - Hali Yepez  Left 11th rib  ATRIAL FIBRILLATION (9972972268)  CAD (Coronary Artery Disease) (414.00)  MI (Myocardial Infarction) (410.90)  Comments:  5/4/2010 CDT 1:24 PM CDT - Kamala Proctor LPN  subendocardial  HTN (hypertension) (2560895247)  Dyslipidemia (272.4)  JAZLYN (Obstructive Sleep Apnea) (327.23)  Obesity (278.00)  Resolved  Inpatient stay (920938264): Onset on 1/8/2017 at 82 years.  Resolved on 1/10/2017 at 82 years.  Comments:  1/16/2017 CST 4:59 PM Hali Tran  @[facility] - TIA  Inpatient stay (633219303): Onset on 3/22/2014 at 80 years.  Resolved.  Comments:  1/16/2017 CST 5:00 PM Hali Tran  @[email or web address] - Chest pain  Inpatient stay (401220273): Onset on 6/12/2010 at 76 years.  Resolved.  Comments:  1/16/2017 CST 4:59 PM Mita Tranri  @[facility]   Procedure history:    Colonoscopy (SNOMED CT 380360467) performed by Boni Garcia on 5/11/2015 at 81  Years.  Comments:  5/14/2015 10:48 AM - Debi Palmer RN  Sedation: MAC  Indication: positive cologuard, personal history of colon polyps, family history of colon cancer  Diverticulosis in the sigmoid & descending colon.  Repeat only if medically indicated.  Screening for malignant neoplasm of colon (SNOMED CT 8687380532) on 3/10/2015 at 81 Years.  Comments:  3/19/2015 10:41 AM - Marisela Boss  Cologuard is positive.  Colonoscopy on 3/8/2012 at 78 Years.  Comments:  12/12/2014 9:43 AM - Kayla Win CMA  Colonoscopy with Dr Matamoros  Sedation: MAC  Colonoscopy (SNOMED CT 938576596) performed by Jeronimo Matamoros MD on 12/8/2009 at 75 Years.  Pacemaker catheter, device (SNOMED CT 4604885775) on 10/15/2008 at 74 Years.  Stenting of pulmonary vein (SNOMED CT 366324042) in 2004 at 70 Years.  Colonoscopy (SNOMED CT 652290856) in 2002 at 68 Years.  Colonoscopy (SNOMED CT 534152005) performed by Leonardo Damon MD on 12/20/1999 at 65 Years.  Surgery (SNOMED CT 397235279) on 4/29/1999 at 65 Years.  Comments:  6/21/2016 9:49 AM - Hali Yepez  Right exploratory tympanotomy transcanal, atticotomy, stapedectomy with insertion of 4.0 Schuknecht piston wire prosthesis    5/4/2010 1:32 PM - Kamala Proctor LPN  right ear   Social History:        Alcohol Assessment            Never      Tobacco Assessment            Never      Substance Abuse Assessment            Never      Employment and Education Assessment            Retired, Work/School description: [employer].   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      Home and Environment Assessment            Marital status: .  2 children.      Exercise and Physical Activity Assessment            Exercise type: Running.        Physical Examination   Vital Signs   7/24/2017 10:26 AM CDT Peripheral Pulse Rate 68 bpm    Pulse Site Radial artery    HR Method Manual    Systolic Blood Pressure 122 mmHg    Diastolic Blood Pressure 78 mmHg    Mean Arterial Pressure 93 mmHg     BP Site Left arm    BP Method Manual      Measurements from flowsheet : Measurements   7/24/2017 10:26 AM CDT Height Measured - Standard 66.5 in    Weight Measured - Standard 208.2 lb    BSA 2.1 m2    Body Mass Index 33.1 kg/m2      General:  Alert and oriented X 3, No acute distress, Warm, Pink, Intact.         Appearance: Within normal limits, Well nourished, Calm.         Hydration: Within normal limits.         Psych: Within normal limits, Appropriate mood and affect, Cooperative, Normal judgment.    Integumentary:  bilateral first MTP joint swelling and erythema - resolved.

## 2022-02-16 NOTE — CARE COORDINATION
Sources of Information:  [ ] Patient, family member, or caregiver (Please list):  [x ] Hospital discharge summary  [ ] Hospital fax  [ ] List of recent hospitalizations or ED visits  [ ] Other:     Discharged From: [facility]   Discharge Date: 1/10/17    Diagnosis/Problem: TIA    Medication Changes: [ ] Yes [x ] No   Medication List Updated: [ ] Yes [ x] No    Needs Referral or Lab: [ ] Yes [ x] No    Needs Follow-up Appointment:  [x ] Within 7 days of discharge (highly complex visit)  [ ] Within 14 days of discharge (moderately complex visit)    Appointment Made With: NANCY   Date: 1/16/17    CC called and spoke to pt's wife as pt was not available. He c/o dizziness spells but wife is monitoring him closely. Advised if worsens or does not improve when he lies down to let us know-she agreed. Confirmed appt with NANCY on Monday. She will let us know if she needs anything before then.Appointment completed

## 2022-02-16 NOTE — PROGRESS NOTES
Patient:   TREVOR DWYER            MRN: [id number]            FIN: [id number]               Age:   84 years     Sex:  Male     :  1934   Associated Diagnoses:   Fatigue   Author:   Chaz Fair MD      Impression and Plan   Diagnosis     Fatigue (PMH10-XH R53.83).     differential includes: medication side effect, JAZLYN, Progression of Dementia, and metabolic causes.     Course:  Worsening.    Plan   Orders     Orders   Charges (Evaluation and Management):  94614 office outpatient visit 25 minutes (Charge) (Order): Quantity: 1, Fatigue.     Orders (Selected)   Outpatient Orders  Ordered (Dispatched)  CBC (h/h, RBC, indices, WBC, Plt)* ([facility]): Specimen Type: Blood, Collection Date: 18 13:39:00 CST  Comprehensive Metabolic Panel* ([facility]): Specimen Type: Serum, Collection Date: 18 13:39:00 CST  Culture, Urine, Routine* ([facility]): Specimen Type: Urine (Clean Catch), Collection Date: 18 13:39:00 CST  Hemoglobin A1c* ([facility]): Specimen Type: Blood, Collection Date: 18 13:39:00 CST  TSH* ([facility]): Specimen Type: Serum, Collection Date: 18 13:39:00 CST  Urinalysis, Complete* ([facility]): Specimen Type: Urine, Collection Date: 18 13:39:00 CST.        Visit Information   Visit type:  New symptom.    Accompanied by:  Spouse.    Source of history:  Self, Spouse.    History limitation:  None.       Chief Complaint   Chief complaint discussed and confirmed correct.     2018 1:01 PM CST   Pt here for consult        History of Present Illness             The patient presents with fatigue, and excessive sleeping.  The fatigue is described as decreased energy, decreased motivation, sleepiness and weariness.  The severity of the fatigue is severe.  The fatigue is constant.  The fatigue symptom(s) has lasted for 2 week(s).  The context of the fatigue: occurred all day.  There are no modifying factors.  Associated symptoms consist of none.  Additional pertinent history: none.        Review  of Systems   Constitutional:  Fatigue, Decreased activity.    Eye:  Negative.    Ear/Nose/Mouth/Throat:  Negative.    Respiratory:  Negative.    Cardiovascular:  Negative.    Gastrointestinal:  Negative.    Genitourinary:  Negative.    Hematology/Lymphatics:  Negative.    Endocrine:  Negative.    Immunologic:  Negative.    Musculoskeletal:  lumbar spinal stenosis.    Integumentary:  Negative.    Neurologic:  Negative.    Psychiatric:  Negative.    All other systems reviewed and negative      Health Status   Allergies:    Allergic Reactions (Selected)  Severity Not Documented  Lisinopril (Elevated creatinine)  Nonallergic Reactions (Selected)  Severity Not Documented  Aricept (Ill)   Medications:  (Selected)   Prescriptions  Prescribed  Metoprolol Tartrate 25 mg oral tablet: ( 12.5 mg ), po, bid, # 90 tab(s), 1 Refill(s), Type: Maintenance  Miscellaneous Rx Supply: 1 Touch Ultra test Strips, See Instructions, Instructions: BID, Supply, # 180 EA, 1 Refill(s), Type: Maintenance  One Touch Ultra Mini Meter: One Touch Ultra Mini Meter, See Instructions, Instructions: Use as directed., Supply, # 1 kit(s), 0 Refill(s), Type: Maintenance  Vitamin B12 1000 mcg oral tablet: ( 1,000 mcg ), po, daily, # 90 tab(s), 1 Refill(s), Type: Maintenance  aspirin 81 mg oral tablet: 1 tab(s) ( 81 mg ), po, daily, # 90 tab(s), 1 Refill(s), Type: Maintenance, faxed to pharmacy (Rx), [facility]; fax# [phone] '; phonw # [phone]  hydroCHLOROthiazide 25 mg oral tablet: 0.5 tab(s) ( 12.5 mg ), PO, Daily, # 45 tab(s), 1 Refill(s), Type: Maintenance  metFORMIN 500 mg oral tablet, extended release: 1 tab(s) ( 500 mg ), PO, bid, # 180 tab(s), 1 Refill(s), Type: Maintenance  nitroglycerin 0.4 mg sublingual tablet: See Instructions, Instructions: 1 tab(s) SL q5min  (not to exceed 3 doses/15 min--if pain persists, seek medical attention), PRN:  for chest pain, # 25 tab(s), 1 Refill(s), Type: Maintenance  warfarin 1 mg oral  tablet: See Instructions, Instructions: take 0.5 tab  po daily on Tuesday, Wednesday,Thursday, Saturday, and Sunday, # 60 tab(s), 3 Refill(s), Type: Maintenance  warfarin 3 mg oral tablet: 1 tab(s) ( 3 mg ), po, daily, # 90 tab(s), 3 Refill(s), Type: Maintenance  Documented Medications  Documented  Multi-vitamin: Multi-vitamin, See Instructions, Supply, 0 Refill(s), Type: Maintenance  finasteride 5 mg oral tablet: 1 tab(s) ( 5 mg ), po, daily, 0 Refill(s), Type: Maintenance  tamsulosin 0.4 mg oral capsule: 1 cap(s) ( 0.4 mg ), po, daily, 0 Refill(s), Type: Maintenance   Problem list:    All Problems  Acute gout / SNOMED CT 612825994 / Confirmed  ATRIAL FIBRILLATION / SNOMED CT 9942934636 / Confirmed  BPH (benign prostatic hyperplasia) / SNOMED CT 832953137 / Confirmed  CAD (Coronary Artery Disease) / ICD-9-.00 / Confirmed  Cardiac pacemaker in situ / SNOMED CT 6771945297 / Confirmed  Dementia / SNOMED CT 815587338408774 / Confirmed  Diabetes Mellitus / ICD-9- / Confirmed  Dyslipidemia / ICD-9-.4 / Confirmed  History of fracture of rib / SNOMED CT 5352570784 / Confirmed  HTN (hypertension) / SNOMED CT 8884514686 / Confirmed  Memory Loss or Impairment / ICD-9-.93 / Confirmed  MI (Myocardial Infarction) / ICD-9-.90 / Confirmed  Obesity / ICD-9-.00 / Probable  Onychomycosis / SNOMED CT 9084649822 / Confirmed  JAZLYN (Obstructive Sleep Apnea) / ICD-9-.23 / Confirmed  Transient ischemic attack (TIA) / SNOMED CT 363054182 / Confirmed  Resolved: Inpatient stay / SNOMED CT 285706589  Resolved: Inpatient stay / SNOMED CT 167302007  Resolved: Inpatient stay / SNOMED CT 554683907      Histories   Past Medical History:    Active  Cardiac pacemaker in situ (8835126942): Onset in 2008 at 74 years.  History of fracture of rib (9672802627): Onset in the month of 10/1997 at 63 years  Comments:  6/21/2016 CDT 9:44 AM CDT - Hali Yepez  Left 11th rib  ATRIAL FIBRILLATION (3726336044)  CAD  (Coronary Artery Disease) (414.00)  MI (Myocardial Infarction) (410.90)  Comments:  2010 CDT 1:24 PM CDT - Kamala Proctor LPN  subendocardial  HTN (hypertension) (8911625229)  Dyslipidemia (272.4)  JAZLYN (Obstructive Sleep Apnea) (327.23)  Obesity (278.00)  Resolved  Inpatient stay (589300421): Onset on 2017 at 82 years.  Resolved on 1/10/2017 at 82 years.  Comments:  2017 CST 4:59 PM Hali Tran  @[facility] TIA  Inpatient stay (295190711): Onset on 3/22/2014 at 80 years.  Resolved.  Comments:  2017 CST 5:00 PM aHli Tran  @[place] - Chest pain  Inpatient stay (077455866): Onset on 2010 at 76 years.  Resolved.  Comments:  2017 CST 4:59 PM Hali Tran  @[facility]   Family History:    Cancer  Father ()  Comments:  2010 8:56 AM - Apple Dick  Bladder CA  CA - Cancer of colon  Mother ()     Procedure history:    Colonoscopy (SNOMED CT 620466760) performed by Boni Garcia on 2015 at 81 Years.  Comments:  2015 10:48 AM - Debi Palmer RN  Sedation: MAC  Indication: positive cologuard, personal history of colon polyps, family history of colon cancer  Diverticulosis in the sigmoid & descending colon.  Repeat only if medically indicated.  Screening for malignant neoplasm of colon (SNOMED CT 0812067610) on 3/10/2015 at 81 Years.  Comments:  3/19/2015 10:41 AM - Marisela Boss  Cologuard is positive.  Colonoscopy on 3/8/2012 at 78 Years.  Comments:  2014 9:43 AM - Kayla Win CMA  Colonoscopy with Dr Matamoros  Sedation: MAC  Colonoscopy (SNOMED CT 608897878) performed by Jeronimo Matamoros MD on 2009 at 75 Years.  Pacemaker catheter, device (SNOMED CT 7854203688) on 10/15/2008 at 74 Years.  Stenting of pulmonary vein (SNOMED CT 321467210) in  at 70 Years.  Colonoscopy (SNOMED CT 219290334) in  at 68 Years.  Colonoscopy (SNOMED CT 749160108) performed by Leonardo Damon MD on 1999 at 65  Years.  [facility] on 4/29/1999 at 65 Years.  Comments:  6/21/2016 9:49 AM - Lucho Hali  Right exploratory tympanotomy transcanal, atticotomy, stapedectomy with insertion of 4.0 Schuknecht piston wire prosthesis    5/4/2010 1:32 PM - Kamala Proctor LPN  right ear      Physical Examination   Vital signs reviewed  and within acceptable limits    Vital Signs   11/26/2018 1:01 PM CST Peripheral Pulse Rate 54 bpm  LOW    Systolic Blood Pressure 118 mmHg    Diastolic Blood Pressure 62 mmHg    Mean Arterial Pressure 81 mmHg    BP Site Right arm    Oxygen Saturation 97 %      Measurements from flowsheet : Measurements   11/26/2018 1:01 PM CST Height Measured - Standard 66.5 in    Weight Measured - Standard 192 lb    BSA 2.02 m2    Body Mass Index 30.52 kg/m2  HI      General:  No acute distress.    Neck:  Supple, No lymphadenopathy, No thyromegaly.    Respiratory:  Lungs are clear to auscultation, Respirations are non-labored, Breath sounds are equal, Symmetrical chest wall expansion.    Cardiovascular:  Normal rate, No murmur, No gallop, Good pulses equal in all extremities, Normal peripheral perfusion, No edema, Irregular irregular rythm.    Gastrointestinal:  Soft, Non-tender, Non-distended, Normal bowel sounds, No organomegaly.    Musculoskeletal:  walks with walker.    Integumentary:  Warm, Dry, Pink.    Neurologic:  Alert, Oriented.    Psychiatric:  Cooperative, Appropriate mood & affect.       Review / Management   Results review:  ua minor: normal.

## 2022-02-16 NOTE — PROGRESS NOTES
Patient:   TREVOR DWYER            MRN: [id number]            FIN: [id number]               Age:   82 years     Sex:  Male     :  1934   Associated Diagnoses:   Transient ischemic attack (TIA)   Author:   Chaz Fair MD      Impression and Plan   Diagnosis     Transient ischemic attack (TIA) (LGU09-UX G45.9).     Course:  Improving.    Plan:    1. Discharge summary and diagnostic tests reviewed  2. Medication reconciliation completed  3. History updated  4. Negative ROS at this time  5. Exam reveals no new problems or complications  6. Diagnostic tests this visit include: INR, CBC, BMP  7. Plan: Continue same  8. No med refills needed at this time   .    Orders     Orders   Charges:  27426 transitional care manage service 7 day discharge (Charge) (Order): Quantity: 1, ATRIAL FIBRILLATION.     Orders (Selected)   Outpatient Orders  Completed  Basic Metabolic Panel (Request): ATRIAL FIBRILLATION  Transient ischemic attack (TIA)  CBC, Platelet; no differential (Request): ATRIAL FIBRILLATION  Transient ischemic attack (TIA)  INR (Request): ATRIAL FIBRILLATION  Transient ischemic attack (TIA).        Visit Information      Date of Service: 2017 11:15 am  Performing Location: [facility]  Encounter#: [id number]      Primary Care Provider (PCP):  Chaz Fair MD    NPI# [id number]      Referring Provider:  No referring provider recorded for selected visit.   Visit type:  Scheduled follow-up.    Accompanied by:  Spouse.    Source of history:  Self, Spouse.    Referral source:  Self.    History limitation:  None.       Chief Complaint   Chief complaint discussed and confirmed correct.     2017 11:26 AM CST   Pt here for post hosp.        History of Present Illness             The patient presents with Short TIA following an episode of the stomach Flu - possibly due to dehydration.  Patient and wife refused significant work up.  No problems since returning home other than unsteady  on feet which existed prior to the event..        Review of Systems   Constitutional:  Negative.    Eye:  Negative.    Ear/Nose/Mouth/Throat:  Negative.    Respiratory:  Negative.    Cardiovascular:  Negative.    Gastrointestinal:  Negative.    Genitourinary:  Negative.    Hematology/Lymphatics:  Negative.    Endocrine:  Negative.    Immunologic:  Negative.    Musculoskeletal:  Negative.    Integumentary:  Negative.    Neurologic:  Negative.    Psychiatric:  Negative.          All other systems reviewed and negative      Health Status   Allergies:    Allergic Reactions (Selected)  Severity Not Documented  Lisinopril (Elevated creatinine)  Nonallergic Reactions (Selected)  Severity Not Documented  Aricept (Ill)   Medications:  (Selected)   Prescriptions  Prescribed  Metoprolol Tartrate 25 mg oral tablet: ( 12.5 mg ), po, bid, # 90 tab(s), 1 Refill(s), Type: Maintenance  Miscellaneous Rx Supply: 1 Touch Ultra test Strips, See Instructions, Instructions: BID, Supply, # 180 EA, 1 Refill(s), Type: Maintenance  One Touch Ultra Mini Meter: One Touch Ultra Mini Meter, See Instructions, Instructions: Use as directed., Supply, # 1 kit(s), 0 Refill(s), Type: Maintenance  Vitamin B12 1000 mcg oral tablet: ( 1,000 mcg ), po, daily, # 90 tab(s), 1 Refill(s), Type: Maintenance  aspirin 81 mg oral tablet: 1 tab(s) ( 81 mg ), po, daily, # 90 tab(s), 1 Refill(s), Type: Maintenance, faxed to pharmacy (Rx), [facility]; fax# [phone] '; phonw # [phone]  hydroCHLOROthiazide 25 mg oral tablet: 0.5 tab(s) ( 12.5 mg ), PO, Daily, # 45 tab(s), 1 Refill(s), Type: Maintenance  metFORMIN 500 mg oral tablet, extended release: 1 tab(s) ( 500 mg ), PO, bid, # 180 tab(s), 1 Refill(s), Type: Maintenance  nitroglycerin 0.4 mg sublingual tablet: See Instructions, Instructions: 1 tab(s) SL q5min  (not to exceed 3 doses/15 min--if pain persists, seek medical attention), PRN:  for chest pain, # 25 tab(s), 1 Refill(s), Type:  Maintenance  warfarin 3 mg oral tablet: See Instructions, Instructions: Take one (1) tablet daily as directed by physician, # 60 EA, 1 Refill(s), Type: Maintenance, Pharmacy: [facility]  Documented Medications  Documented  warfarin 1 mg oral tablet: 1 tab(s) ( 1 mg ), PO, Daily, # 90 tab(s), 0 Refill(s), Type: Maintenance   Problem list:    All Problems  ATRIAL FIBRILLATION / SNOMED CT 3050088933 / Confirmed  CAD (Coronary Artery Disease) / ICD-9-.00 / Confirmed  Cardiac pacemaker in situ / SNOMED CT 0614339322 / Confirmed  Dementia / SNOMED CT 046632122898689 / Confirmed  Diabetes Mellitus / ICD-9- / Confirmed  Dyslipidemia / ICD-9-.4 / Confirmed  History of fracture of rib / SNOMED CT 0939738494 / Confirmed  HTN (hypertension) / SNOMED CT 3015443485 / Confirmed  Memory Loss or Impairment / ICD-9-.93 / Confirmed  MI (Myocardial Infarction) / ICD-9-.90 / Confirmed  Obesity / ICD-9-.00 / Probable  Onychomycosis / SNOMED CT 6157729907 / Confirmed  JAZLYN (Obstructive Sleep Apnea) / ICD-9-.23 / Confirmed  Transient ischemic attack (TIA) / SNOMED CT 157949679 / Confirmed  Resolved: * Hospitalized @ [facility] - Chest pain  Resolved: *Hospitalized@[facility] - Concussion      Histories   Past Medical History:    Active  Cardiac pacemaker in situ (8251197009): Onset in 2008 at 74 years.  History of fracture of rib (9332418256): Onset in the month of 10/1997 at 63 years  Comments:  6/21/2016 CDT 9:44 AM CDT - Hali Yepez  Left 11th rib  ATRIAL FIBRILLATION (1567139381)  CAD (Coronary Artery Disease) (414.00)  MI (Myocardial Infarction) (410.90)  Comments:  5/4/2010 CDT 1:24 PM CDT - Kamala Proctor LPN  subendocardial  HTN (hypertension) (0876915458)  Dyslipidemia (272.4)  JAZLYN (Obstructive Sleep Apnea) (327.23)  Obesity (278.00)  Resolved  * Hospitalized @ [facility] - Chest pain: Onset on 3/22/2014 at 80 years.  Resolved.  *Hospitalized@[facility] - Concussion: Onset on 6/12/2010 at  76 years.  Resolved.   Family History:    Cancer  Father ()  Comments:  2010 8:56 AM - Kapil Apple  Bladder CA  CA - Cancer of colon  Mother ()     Procedure history:    Colonoscopy (SNOMED CT 461187577) performed by Boni Garcia on 2015 at 81 Years.  Comments:  2015 10:48 AM - Debi Palmer RN  Sedation: MAC  Indication: positive cologuard, personal history of colon polyps, family history of colon cancer  Diverticulosis in the sigmoid & descending colon.  Repeat only if medically indicated.  Screening for malignant neoplasm of colon (SNOMED CT 9993077165) on 3/10/2015 at 81 Years.  Comments:  3/19/2015 10:41 AM - Marisela Boss  Cologuard is positive.  Colonoscopy on 3/8/2012 at 78 Years.  Comments:  2014 9:43 AM - Kayla Win CMA  Colonoscopy with Dr Matamoros  Sedation: MAC  Colonoscopy (SNOMED CT 995175920) performed by Jeronimo Matamoros MD on 2009 at 75 Years.  Pacemaker catheter, device (SNOMED CT 5049368778) on 10/15/2008 at 74 Years.  Stenting of pulmonary vein (SNOMED CT 370271636) in  at 70 Years.  Colonoscopy (SNOMED CT 893266183) in  at 68 Years.  Colonoscopy (SNOMED CT 745033982) performed by Leonardo Damon MD on 1999 at 65 Years.  Surgery (SNOMED CT 833094939) on 1999 at 65 Years.  Comments:  2016 9:49 AM - Hali Yepez  Right exploratory tympanotomy transcanal, atticotomy, stapedectomy with insertion of 4.0 Schuknecht piston wire prosthesis    2010 1:32 PM - Kamala Proctor LPN  right ear   Social History:        Alcohol Assessment            Never      Tobacco Assessment            Never      Substance Abuse Assessment            Never      Employment and Education Assessment            Retired, Work/School description: [employer].   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      Home and Environment Assessment            Marital status: .  2 children.      Exercise and Physical Activity Assessment             Exercise type: Running.        Physical Examination   Vital Signs   1/16/2017 11:26 AM CST Peripheral Pulse Rate 64 bpm    Pulse Site Radial artery    HR Method Manual    Systolic Blood Pressure 128 mmHg    Diastolic Blood Pressure 72 mmHg    Mean Arterial Pressure 91 mmHg    BP Site Left arm    BP Method Manual      Measurements from flowsheet : Measurements   1/16/2017 11:26 AM CST Height Measured - Standard 66.5 in    Weight Measured - Standard 207 lb    BSA 2.1 m2    Body Mass Index 32.91 kg/m2      General:  No acute distress.    Neck:  Supple, No lymphadenopathy, No thyromegaly.    Respiratory:  Lungs are clear to auscultation, Respirations are non-labored, Breath sounds are equal, Symmetrical chest wall expansion.    Cardiovascular:  Normal rate, Regular rhythm, No murmur, No gallop, Good pulses equal in all extremities, Normal peripheral perfusion, No edema.    Gastrointestinal:  Soft, Non-tender, Non-distended, Normal bowel sounds, No organomegaly.    Integumentary:  Warm, Dry, Pink.    Neurologic:  Alert, Oriented.    Psychiatric:  Cooperative, Appropriate mood & affect.       Health Maintenance      Recommendations     Pending (in the next year)        OverDue           Tetanus Vaccine due  03/11/15  and every 10  year(s)           DM - Microalbumin due  09/30/15  and every 1  year(s)           DM - Eye Exam due  11/10/16  and every 1  year(s)        Due            DM - Communication with Managing Provider due  01/16/17  and every 1  year(s)           DM - Foot Exam due  01/16/17  and every 1  year(s)           Fall Risk Screen (Male) due  01/16/17  and every 1  year(s)           Pneumococcal Vaccine due  01/16/17  One-time only        Near Due            Depression Screen (Male) near due  02/02/17  and every 1  year(s)           DM - HgbA1c near due  02/28/17  and every 3  month(s)        Due In Future            Influenza Vaccine not due until  11/07/17  and every 1  year(s)           Lipid Disorders  Screen (Male) not due until  11/29/17  and every 1  year(s)           Type 2 Diabetes Mellitus Screen (Male) not due until  11/29/17  and every 1  year(s)     Satisfied (in the past 1 year)        Satisfied            Body Mass Index Check (Male) on  01/16/17.           Body Mass Index Check (Male) on  01/02/17.           Body Mass Index Check (Male) on  11/29/16.           Body Mass Index Check (Male) on  05/11/16.           Body Mass Index Check (Male) on  02/02/16.           DM - HgbA1c on  11/29/16.           DM - HgbA1c on  04/12/16.           Depression Screen (Male) on  02/02/16.           High Blood Pressure Screen (Male) on  01/16/17.           High Blood Pressure Screen (Male) on  01/02/17.           High Blood Pressure Screen (Male) on  01/02/17.           High Blood Pressure Screen (Male) on  11/29/16.           High Blood Pressure Screen (Male) on  10/24/16.           High Blood Pressure Screen (Male) on  09/08/16.           High Blood Pressure Screen (Male) on  06/30/16.           High Blood Pressure Screen (Male) on  06/02/16.           High Blood Pressure Screen (Male) on  05/11/16.           High Blood Pressure Screen (Male) on  05/02/16.           High Blood Pressure Screen (Male) on  04/04/16.           High Blood Pressure Screen (Male) on  03/02/16.           High Blood Pressure Screen (Male) on  02/02/16.           High Blood Pressure Screen (Male) on  01/28/16.           Influenza Vaccine on  11/07/16.           Lipid Disorders Screen (Male) on  11/29/16.           Lipid Disorders Screen (Male) on  11/29/16.           Lipid Disorders Screen (Male) on  11/29/16.           Lipid Disorders Screen (Male) on  11/29/16.           Lipid Disorders Screen (Male) on  04/12/16.           Obesity Screen and Counseling (Male) on  01/16/17.           Obesity Screen and Counseling (Male) on  01/02/17.           Obesity Screen and Counseling (Male) on  11/29/16.           Obesity Screen and Counseling (Male)  on  09/08/16.           Obesity Screen and Counseling (Male) on  06/30/16.           Obesity Screen and Counseling (Male) on  06/02/16.           Obesity Screen and Counseling (Male) on  05/11/16.           Obesity Screen and Counseling (Male) on  02/02/16.           Tobacco Use Screen (Male) on  01/16/17.           Tobacco Use Screen (Male) on  01/02/17.           Tobacco Use Screen (Male) on  11/29/16.           Tobacco Use Screen (Male) on  09/08/16.           Tobacco Use Screen (Male) on  06/30/16.           Tobacco Use Screen (Male) on  06/02/16.           Tobacco Use Screen (Male) on  05/11/16.           Tobacco Use Screen (Male) on  02/02/16.           Type 2 Diabetes Mellitus Screen (Male) on  11/29/16.           Type 2 Diabetes Mellitus Screen (Male) on  04/12/16.

## 2022-02-16 NOTE — PROGRESS NOTES
Patient:   TREVOR DYWER            MRN: [id number]            FIN: [id number]               Age:   83 years     Sex:  Male     :  1934   Associated Diagnoses:   Onychomycosis   Author:   Chaz Fair MD      Impression and Plan   Diagnosis     Onychomycosis ([id number]-NF B35.1).     Course:  Worsening.    Orders     Orders   Charges (Evaluation and Management):  20262 office outpatient visit 15 minutes (Charge) (Order): Quantity: 1, Onychomycosis.        Visit Information   Visit type:  New symptom.    Accompanied by:  Spouse.    Source of history:  Self, Spouse.    History limitation:  None.       Chief Complaint   2017 10:58 AM CDT   Pt here for toenail trimming     patient unable to trim his own toenails due to lumbar problems and finger dexterity problems      History of Present Illness             The patient presents with bilateral large toe pain caused by toenail deformity.  Exacerbating factors consist of movement and walking.  Relieving factors consist of none.  Associated symptoms consist of gait disturbance.  Additional pertinent history: none.        Review of Systems   Constitutional:  Negative.    Eye:  Negative.    Ear/Nose/Mouth/Throat:  Negative.    Respiratory:  Negative.    Cardiovascular:  Negative.    Gastrointestinal:  Negative.    Genitourinary:  Negative.    Hematology/Lymphatics:  Negative.    Endocrine:  Negative.    Immunologic:  Negative.    Musculoskeletal:  Negative.    Integumentary:  Negative except as documented in history of present illness.    Neurologic:  Negative.    Psychiatric:  Negative.    All other systems reviewed and negative      Health Status   Allergies:    Allergic Reactions (Selected)  Severity Not Documented  Lisinopril (Elevated creatinine)  Nonallergic Reactions (Selected)  Severity Not Documented  Aricept (Ill)   Medications:  (Selected)   Prescriptions  Prescribed  Metoprolol Tartrate 25 mg oral tablet: ( 12.5 mg ), po, bid, # 90 tab(s), 1  Refill(s), Type: Maintenance  Miscellaneous Rx Supply: 1 Touch Ultra test Strips, See Instructions, Instructions: BID, Supply, # 180 EA, 1 Refill(s), Type: Maintenance  One Touch Ultra Mini Meter: One Touch Ultra Mini Meter, See Instructions, Instructions: Use as directed., Supply, # 1 kit(s), 0 Refill(s), Type: Maintenance  Vitamin B12 1000 mcg oral tablet: ( 1,000 mcg ), po, daily, # 90 tab(s), 1 Refill(s), Type: Maintenance  aspirin 81 mg oral tablet: 1 tab(s) ( 81 mg ), po, daily, # 90 tab(s), 1 Refill(s), Type: Maintenance, faxed to pharmacy (Rx), [facility]; [facility] [phone] '; phonw # [phone]  hydroCHLOROthiazide 25 mg oral tablet: 0.5 tab(s) ( 12.5 mg ), PO, Daily, # 45 tab(s), 1 Refill(s), Type: Maintenance  metFORMIN 500 mg oral tablet, extended release: 1 tab(s) ( 500 mg ), PO, bid, # 180 tab(s), 1 Refill(s), Type: Maintenance  nitroglycerin 0.4 mg sublingual tablet: See Instructions, Instructions: 1 tab(s) SL q5min  (not to exceed 3 doses/15 min--if pain persists, seek medical attention), PRN:  for chest pain, # 25 tab(s), 1 Refill(s), Type: Maintenance  warfarin 3 mg oral tablet: See Instructions, Instructions: Take one (1) tablet daily as directed by physician, # 60 EA, 1 Refill(s), Type: Maintenance, Pharmacy: [facility]  Documented Medications  Documented  atorvastatin 10 mg oral tablet: 1 tab(s) ( 10 mg ), po, daily, 0 Refill(s), Type: Maintenance  warfarin 1 mg oral tablet: 1 tab(s) ( 1 mg ), PO, Daily, # 90 tab(s), 0 Refill(s), Type: Maintenance   Problem list:    All Problems  ATRIAL FIBRILLATION / SNOMED CT 7972385898 / Confirmed  CAD (Coronary Artery Disease) / ICD-9-.00 / Confirmed  Cardiac pacemaker in situ / SNOMED CT 0197401796 / Confirmed  Dementia / SNOMED CT 670768387867624 / Confirmed  Diabetes Mellitus / ICD-9- / Confirmed  Dyslipidemia / ICD-9-.4 / Confirmed  History of fracture of rib / SNOMED CT 7358469259 / Confirmed  HTN (hypertension)  / SNOMED CT 1176350643 / Confirmed  Memory Loss or Impairment / ICD-9-.93 / Confirmed  MI (Myocardial Infarction) / ICD-9-.90 / Confirmed  Obesity / ICD-9-.00 / Probable  Onychomycosis / SNOMED CT 9366336296 / Confirmed  JAZLYN (Obstructive Sleep Apnea) / ICD-9-.23 / Confirmed  Transient ischemic attack (TIA) / SNOMED CT 235233271 / Confirmed  Resolved: Inpatient stay / SNOMED CT 877586646  Resolved: Inpatient stay / SNOMED CT 644388442  Resolved: Inpatient stay / SNOMED CT 156883052      Histories   Past Medical History:    Active  Cardiac pacemaker in situ (8275070515): Onset in 2008 at 74 years.  History of fracture of rib (3502288598): Onset in the month of 10/1997 at 63 years  Comments:  6/21/2016 CDT 9:44 AM CDT - Hali Yepez  Left 11th rib  ATRIAL FIBRILLATION (1084542921)  CAD (Coronary Artery Disease) (414.00)  MI (Myocardial Infarction) (410.90)  Comments:  5/4/2010 CDT 1:24 PM CDT - Kamala Proctor LPN  subendocardial  HTN (hypertension) (7434204826)  Dyslipidemia (272.4)  JAZLYN (Obstructive Sleep Apnea) (327.23)  Obesity (278.00)  Resolved  Inpatient stay (893908677): Onset on 1/8/2017 at 82 years.  Resolved on 1/10/2017 at 82 years.  Comments:  1/16/2017 CST 4:59 PM Hali Tran  @[facility] TIA  Inpatient stay (092508156): Onset on 3/22/2014 at 80 years.  Resolved.  Comments:  1/16/2017 CST 5:00 PM Hali Tran  @[place] - Chest pain  Inpatient stay (115803079): Onset on 6/12/2010 at 76 years.  Resolved.  Comments:  1/16/2017 CST 4:59 PM Hali Tran  @[facility] - Concussion      Physical Examination   Vital Signs   4/24/2017 10:58 AM CDT Peripheral Pulse Rate 60 bpm    Pulse Site Radial artery    HR Method Manual    Systolic Blood Pressure 122 mmHg    Diastolic Blood Pressure 70 mmHg    Mean Arterial Pressure 87 mmHg    BP Site Left arm    BP Method Manual      Measurements from flowsheet : Measurements   4/24/2017 10:58 AM CDT   Weight Measured - Standard                 204.2 lb     General:  Alert and oriented X 3, No acute distress, Warm, Pink, Intact.         Appearance: Within normal limits, Well nourished, Calm.         Hydration: Within normal limits.         Psych: Within normal limits, Appropriate mood and affect, Cooperative, Normal judgment.    Integumentary:  bilateral large toe nail plate deformity with thickening and incurvation due to tinea unguim.

## 2022-02-16 NOTE — CARE COORDINATION
Pt appears on [facility] chronic disease panel as out of parameters for A1C date.  Placed new RTC due now for chronic disease visit and fasting labs.  Wait for pt rsp.

## 2022-02-16 NOTE — PROGRESS NOTES
"   Patient:   TREVOR DWYER            MRN: [id number]            FIN: [id number]               Age:   85 years     Sex:  Male     :  1934   Associated Diagnoses:   Bone metastases   Author:   Chaz Fair MD      Impression and Plan   Diagnosis     Bone metastases (SNZ76-TY C79.51).     Course:  Worsening.    Plan:  Biopsy and Oncology consult planned in near future.    Orders     Orders   Charges (Evaluation and Management):  19582 office outpatient visit 15 minutes (Charge) (Order): Quantity: 1, Bone metastases.     Orders (Selected)   Prescriptions  Prescribed  MS Contin 15 mg oral tablet, extended release: = 1 tab(s) ( 15 mg ), Oral, q8 hrs, # 90 tab(s), 0 Refill(s), Type: Maintenance.        Visit Information      Date of Service: 2019 12:52 pm  Performing Location: [facility]  Encounter#: [id number]      Primary Care Provider (PCP):  Chaz Fair MD    NPI# [id number]      Referring Provider:  Chaz Fair MD, NPI# [id number]   Visit type:  New symptom.    Accompanied by:  Spouse.    Source of history:  Self, Spouse.    History limitation:  None.       Chief Complaint   Chief complaint discussed and confirmed correct.     2019 12:55 PM CDT   Pt [facility] \"touch [facility]\" [facility]        [facility] of Present Illness             The patient presents with The patient typically receives most of his care at the [facility] in the [facility].  Over the past 2 to 3 months the patient has had increasing widespread pain along with a 30 to 40 pound weight loss.  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  So far the patient has been treated with prednisone and tramadol.  He is also been taking as needed Tylenol.  This regimen is not controlling his pain at all.  They are here to seek better pain control.  He will be undergoing a biopsy " and an oncology consult in the near future.  It is unclear if a curative treatment will be prescribed.  .        Review of Systems   Constitutional:  Weakness, Fatigue, Decreased activity, Malaise, Loss of appetite, Weight loss.    Eye:  Negative.    Ear/Nose/Mouth/Throat:  Negative.    Respiratory:  Negative.    Cardiovascular:  Negative.    Gastrointestinal:  Negative.    Genitourinary:  Negative.    Hematology/Lymphatics:  Negative.    Endocrine:  Negative.    Immunologic:  Negative.    Musculoskeletal:  Back pain, Neck pain, Joint pain, Muscle pain.    Integumentary:  Negative.    Neurologic:  Negative.    Psychiatric:  Negative.    All other systems reviewed and negative      Health Status   Allergies:    Allergic Reactions (Selected)  Severity Not Documented  Lisinopril (Elevated creatinine)  Nonallergic Reactions (Selected)  Severity Not Documented  Aricept (Ill)   Medications:  (Selected)   Prescriptions  Prescribed  MS Contin 15 mg oral tablet, extended release: = 1 tab(s) ( 15 mg ), Oral, q8 hrs, # 90 tab(s), 0 Refill(s), Type: Maintenance  Metoprolol Tartrate 25 mg oral tablet: ( 12.5 mg ), po, bid, # 90 tab(s), 1 Refill(s), Type: Maintenance  Miscellaneous Rx Supply: 1 Touch Ultra test Strips, See Instructions, Instructions: BID, Supply, # 180 EA, 1 Refill(s), Type: Maintenance  One Touch Ultra Mini Meter: One Touch Ultra Mini Meter, See Instructions, Instructions: Use as directed., Supply, # 1 kit(s), 0 Refill(s), Type: Maintenance  OneTouch Ultra Test Strip: OneTouch Ultra Test Strip, See Instructions, Instructions: Use as directed by physician, Supply, # 100 EA, 5 Refill(s), Type: Maintenance, Pharmacy: [facility], Use as directed by physician  Vitamin B12 1000 mcg oral tablet: ( 1,000 mcg ), po, daily, # 90 tab(s), 1 Refill(s), Type: Maintenance  aspirin 81 mg oral tablet: 1 tab(s) ( 81 mg ), po, daily, # 90 tab(s), 1 Refill(s), Type: Maintenance, faxed to pharmacy (Rx), [facility]; fax# [phone] '; phonw # [phone]  hydroCHLOROthiazide 25 mg oral tablet: 0.5 tab(s) ( 12.5 mg ), PO, Daily, # 45 tab(s), 1 Refill(s), Type: Maintenance  metFORMIN 500 mg oral tablet, extended release: 1 tab(s) ( 500 mg ), PO, bid, # 180 tab(s), 1 Refill(s), Type: Maintenance  nitroglycerin 0.4 mg sublingual tablet: See Instructions, Instructions: 1 tab(s) SL q5min  (not to exceed 3 doses/15 min--if pain persists, seek medical attention), PRN:  for chest pain, # 25 tab(s), 1 Refill(s), Type: Maintenance  warfarin 1 mg oral tablet: See Instructions, Instructions: take 0.5 tab  po daily on Tuesday, Wednesday,Thursday, Saturday, and Sunday, # 60 tab(s), 3 Refill(s), Type: Maintenance  warfarin 3 mg oral tablet: 1 tab(s) ( 3 mg ), po, daily, # 90 tab(s), 3 Refill(s), Type: Maintenance  Documented Medications  Documented  Multi-vitamin: Multi-vitamin, See Instructions, Supply, 0 Refill(s), Type: Maintenance  finasteride 5 mg oral tablet: 1 tab(s) ( 5 mg ), po, daily, 0 Refill(s), Type: Maintenance  predniSONE: Oral, daily, 0 Refill(s), Type: Maintenance  tamsulosin 0.4 mg oral capsule: 1 cap(s) ( 0.4 mg ), po, daily, 0 Refill(s), Type: Maintenance   Problem list:    All Problems  Acute gout / SNOMED CT 340994504 / Confirmed  ATRIAL FIBRILLATION / SNOMED CT 2534608869 / Confirmed  BPH (benign prostatic hyperplasia) / SNOMED CT 463264987 / Confirmed  CAD (Coronary Artery Disease) / ICD-9-.00 / Confirmed  Cardiac pacemaker in situ / SNOMED CT 3251675854 / Confirmed  Dementia / SNOMED CT 519662128162447 / Confirmed  Diabetes Mellitus / ICD-9- / Confirmed  Dyslipidemia / ICD-9-.4 / Confirmed  History of fracture of rib / SNOMED CT 1787461045 / Confirmed  HTN (hypertension) / SNOMED CT 3931532534 / Confirmed  Memory Loss or Impairment / ICD-9-.93 / Confirmed  MI (Myocardial Infarction) / ICD-9-.90 / Confirmed  Obesity / ICD-9-.00 / Probable  Onychomycosis / SNOMED CT 7245595200  / Confirmed  JAZLYN (Obstructive Sleep Apnea) / ICD-9-.23 / Confirmed  Transient ischemic attack (TIA) / SNOMED CT 304120260 / Confirmed  Resolved: Inpatient stay / SNOMED CT 372885368  Resolved: Inpatient stay / SNOMED CT 353816131  Resolved: Inpatient stay / SNOMED CT 412673232      Histories   Past Medical History:    Active  Cardiac pacemaker in situ (7914272732): Onset in  at 74 years.  History of fracture of rib (5744649693): Onset in the month of 10/1997 at 63 years  Comments:  2016 CDT 9:44 AM CDT - Hali Yepez  Left 11th rib  ATRIAL FIBRILLATION (3917198744)  CAD (Coronary Artery Disease) (414.00)  MI (Myocardial Infarction) (410.90)  Comments:  2010 CDT 1:24 PM CDT - Kamala Proctor LPN  subendocardial  HTN (hypertension) (2839353521)  Dyslipidemia (272.4)  JAZLYN (Obstructive Sleep Apnea) (327.23)  Obesity (278.00)  Resolved  Inpatient stay (154649305): Onset on 2017 at 82 years.  Resolved on 1/10/2017 at 82 years.  Comments:  2017 CST 4:59 PM Hali Tran  @[facility]  Inpatient stay (685823719): Onset on 3/22/2014 at 80 years.  Resolved.  Comments:  2017 CST 5:00 PM Hali Tran  @[facility] Chest pain  Inpatient stay (431750061): Onset on 2010 at 76 years.  Resolved.  Comments:  2017 CST 4:59 PM Hali Tran  @[facility]   Family History:    Cancer  Father ()  Comments:  2010 8:56 AM CST - Apple Dick  Bladder CA  CA - Cancer of colon  Mother ()     Procedure history:    Colonoscopy (SNOMED CT 998155531) performed by Boni Garcia on 2015 at 81 Years.  Comments:  2015 10:48 AM CDT - Estela HOFFMANN, Debi  Sedation: MAC  Indication: positive cologuard, personal history of colon polyps, family history of colon cancer  Diverticulosis in the sigmoid & descending colon.  Repeat only if medically indicated.  Screening for malignant neoplasm of colon (SNOMED CT 9683228743) on 3/10/2015 at 81  Years.  Comments:  3/19/2015 10:41 AM CDT - Marisela Boss is positive.  Colonoscopy on 3/8/2012 at 78 Years.  Comments:  12/12/2014 9:43 AM CST - Kayla Win CMA  Colonoscopy with Dr Matamoros  Sedation: MAC  Colonoscopy (SNOMED CT 839175120) performed by Jeronimo Matamoros MD on 12/8/2009 at 75 Years.  Pacemaker catheter, device (SNOMED CT 6858380745) on 10/15/2008 at 74 Years.  Stenting of pulmonary vein (SNOMED CT 181238486) in 2004 at 70 Years.  Colonoscopy (SNOMED CT 362358959) in 2002 at 68 Years.  Colonoscopy (SNOMED CT 296840258) performed by Leonardo Damon MD on 12/20/1999 at 65 Years.  Surgery (SNOMED CT 910243548) on 4/29/1999 at 65 Years.  Comments:  6/21/2016 9:49 AM CDT - Hali Yepez  Right exploratory tympanotomy transcanal, atticotomy, stapedectomy with insertion of 4.0 Schuknecht piston wire prosthesis    5/4/2010 1:32 PM CDT - Kamala Proctor LPN  right ear   Social History:        Alcohol Assessment            Never      Tobacco Assessment            Never      Substance Abuse Assessment            Never      Employment and Education Assessment            Retired, Work/School description: [employer].   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      Home and Environment Assessment            Marital status: .  2 children.      Exercise and Physical Activity Assessment            Exercise type: Running.      Physical Examination   Vital signs reviewed  and within acceptable limits    Vital Signs   6/11/2019 12:55 PM CDT Peripheral Pulse Rate 54 bpm  LOW    Systolic Blood Pressure 128 mmHg    Diastolic Blood Pressure 78 mmHg    Mean Arterial Pressure 95 mmHg    Oxygen Saturation 97 %      Measurements from flowsheet : Measurements   6/11/2019 12:55 PM CDT Height Measured - Standard 66.5 in    Weight Measured - Standard 171 lb    BSA 1.91 m2    Body Mass Index 27.18 kg/m2  HI      General:  No acute distress.    Neck:  Supple, No lymphadenopathy, No thyromegaly.     Respiratory:  Lungs are clear to auscultation, Respirations are non-labored, Breath sounds are equal, Symmetrical chest wall expansion.    Cardiovascular:  Normal rate, Regular rhythm, No murmur, No gallop, Good pulses equal in all extremities, Normal peripheral perfusion, No edema.    Gastrointestinal:  Soft, Non-tender, Non-distended, Normal bowel sounds, No organomegaly.    Integumentary:  Warm, Dry, Pink.    Neurologic:  Alert, Oriented.    Psychiatric:  Cooperative, Appropriate mood & affect.

## 2022-02-16 NOTE — PROGRESS NOTES
Patient:   TREVOR DWYER            MRN: [id number]            FIN: [id number]               Age:   83 years     Sex:  Male     :  1934   Associated Diagnoses:   Acute gout   Author:   Chaz Fair MD      Impression and Plan   Diagnosis     Acute gout (IYQ86-FQ M10.9).     Course:  Worsening.    Orders     Orders   Charges (Evaluation and Management):  10941 office outpatient visit 15 minutes (Charge) (Order): Quantity: 1, Acute gout.     Orders (Selected)   Prescriptions  Prescribed  predniSONE 20 mg oral tablet: 1 tab(s) ( 20 mg ), PO, bid, # 14 tab(s), 0 Refill(s), Type: Maintenance, Pharmacy: [facility], 1 tab(s) po bid,x7 day(s).        Visit Information   Visit type:  New symptom.    Accompanied by:  Spouse.    Source of history:  Self, Spouse.    History limitation:  None.       Chief Complaint   2017 11:28 AM CDT   Pt. here for gout.        History of Present Illness             The patient presents with bilateral large toe pain caused by inflammation of the first MTP joint.  Exacerbating factors consist of movement and walking.  Relieving factors consist of none.  Associated symptoms consist of gait disturbance.  Additional pertinent history: none.        Review of Systems   Constitutional:  Negative.    Eye:  Negative.    Ear/Nose/Mouth/Throat:  Negative.    Respiratory:  Negative.    Cardiovascular:  Negative.    Gastrointestinal:  Negative.    Genitourinary:  Negative.    Hematology/Lymphatics:  Negative.    Endocrine:  Negative.    Immunologic:  Negative.    Musculoskeletal:  Negative.    Integumentary:  Negative except as documented in history of present illness.    Neurologic:  Negative.    Psychiatric:  Negative.    All other systems reviewed and negative      Health Status   Allergies:    Allergic Reactions (Selected)  Severity Not Documented  Lisinopril (Elevated creatinine)  Nonallergic Reactions (Selected)  Severity Not Documented  Aricept (Ill)   Medications:   (Selected)   Prescriptions  Prescribed  Metoprolol Tartrate 25 mg oral tablet: ( 12.5 mg ), po, bid, # 90 tab(s), 1 Refill(s), Type: Maintenance  Miscellaneous Rx Supply: 1 Touch Ultra test Strips, See Instructions, Instructions: BID, Supply, # 180 EA, 1 Refill(s), Type: Maintenance  One Touch Ultra Mini Meter: One Touch Ultra Mini Meter, See Instructions, Instructions: Use as directed., Supply, # 1 kit(s), 0 Refill(s), Type: Maintenance  Vitamin B12 1000 mcg oral tablet: ( 1,000 mcg ), po, daily, # 90 tab(s), 1 Refill(s), Type: Maintenance  aspirin 81 mg oral tablet: 1 tab(s) ( 81 mg ), po, daily, # 90 tab(s), 1 Refill(s), Type: Maintenance, faxed to pharmacy (Rx), [facility]; [facility]# [phone] '; phonw # [phone]  hydroCHLOROthiazide 25 mg oral tablet: 0.5 tab(s) ( 12.5 mg ), PO, Daily, # 45 tab(s), 1 Refill(s), Type: Maintenance  metFORMIN 500 mg oral tablet, extended release: 1 tab(s) ( 500 mg ), PO, bid, # 180 tab(s), 1 Refill(s), Type: Maintenance  nitroglycerin 0.4 mg sublingual tablet: See Instructions, Instructions: 1 tab(s) SL q5min  (not to exceed 3 doses/15 min--if pain persists, seek medical attention), PRN:  for chest pain, # 25 tab(s), 1 Refill(s), Type: Maintenance  predniSONE 20 mg oral tablet: 1 tab(s) ( 20 mg ), PO, bid, # 14 tab(s), 0 Refill(s), Type: Maintenance, Pharmacy: [facility], 1 tab(s) po bid,x7 day(s)  warfarin 3 mg oral tablet: See Instructions, Instructions: Take one (1) tablet daily as directed by physician, # 60 EA, 5 Refill(s), Type: Maintenance, Pharmacy: [facility], Take one (1) tablet daily as directed by physician  Documented Medications  Documented  Multi-vitamin: Multi-vitamin, See Instructions, Supply, 0 Refill(s), Type: Maintenance  warfarin 1 mg oral tablet: 1 tab(s) ( 1 mg ), PO, Daily, # 90 tab(s), 0 Refill(s), Type: Maintenance   Problem list:    All Problems  ATRIAL FIBRILLATION / SNOMED CT 3240143260 / Confirmed  CAD (Coronary Artery  Disease) / ICD-9-.00 / Confirmed  Cardiac pacemaker in situ / SNOMED CT 6060470483 / Confirmed  Dementia / SNOMED CT 214785551851230 / Confirmed  Diabetes Mellitus / ICD-9- / Confirmed  Dyslipidemia / ICD-9-.4 / Confirmed  History of fracture of rib / SNOMED CT 0336900252 / Confirmed  HTN (hypertension) / SNOMED CT 7145878641 / Confirmed  Memory Loss or Impairment / ICD-9-.93 / Confirmed  MI (Myocardial Infarction) / ICD-9-.90 / Confirmed  Obesity / ICD-9-.00 / Probable  Onychomycosis / SNOMED CT 8343897077 / Confirmed  JAZLYN (Obstructive Sleep Apnea) / ICD-9-.23 / Confirmed  Transient ischemic attack (TIA) / SNOMED CT 789370409 / Confirmed  Resolved: Inpatient stay / SNOMED CT 149591814  Resolved: Inpatient stay / SNOMED CT 556248066  Resolved: Inpatient stay / SNOMED CT 537627791      Histories   Past Medical History:    Active  Cardiac pacemaker in situ (6705300442): Onset in 2008 at 74 years.  History of fracture of rib (9365074996): Onset in the month of 10/1997 at 63 years  Comments:  6/21/2016 CDT 9:44 AM CDT - Hali Yepez  Left 11th rib  ATRIAL FIBRILLATION (7553701572)  CAD (Coronary Artery Disease) (414.00)  MI (Myocardial Infarction) (410.90)  Comments:  5/4/2010 CDT 1:24 PM CDT - Kamala Proctor LPN  subendocardial  HTN (hypertension) (6320686141)  Dyslipidemia (272.4)  JAZLYN (Obstructive Sleep Apnea) (327.23)  Obesity (278.00)  Resolved  Inpatient stay (536715851): Onset on 1/8/2017 at 82 years.  Resolved on 1/10/2017 at 82 years.  Comments:  1/16/2017 CST 4:59 PM Hali Tran  @[facility] TIA  Inpatient stay (871434307): Onset on 3/22/2014 at 80 years.  Resolved.  Comments:  1/16/2017 CST 5:00 PM Hali Tran  @[facility] - Chest pain  Inpatient stay (353943048): Onset on 6/12/2010 at 76 years.  Resolved.  Comments:  1/16/2017 CST 4:59 PM CST - Hali Yepez  @[facility]   Procedure history:    Colonoscopy (SNOMED CT 470018907) performed by  Boni Garcia on 5/11/2015 at 81 Years.  Comments:  5/14/2015 10:48 AM - Debi Palmer RN  Sedation: MAC  Indication: positive cologuard, personal history of colon polyps, family history of colon cancer  Diverticulosis in the sigmoid & descending colon.  Repeat only if medically indicated.  Screening for malignant neoplasm of colon (SNOMED CT 7659039400) on 3/10/2015 at 81 Years.  Comments:  3/19/2015 10:41 AM - Marisela Bsos  Cologuard is positive.  Colonoscopy on 3/8/2012 at 78 Years.  Comments:  12/12/2014 9:43 AM - Kayla Win CMA  Colonoscopy with Dr Matamoros  Sedation: MAC  Colonoscopy (SNOMED CT 070482022) performed by Jeronimo Matamoros MD on 12/8/2009 at 75 Years.  Pacemaker catheter, device (SNOMED CT 5256929739) on 10/15/2008 at 74 Years.  Stenting of pulmonary vein (SNOMED CT 458512462) in 2004 at 70 Years.  Colonoscopy (SNOMED CT 424684869) in 2002 at 68 Years.  Colonoscopy (SNOMED CT 382157252) performed by Leonardo Damon MD on 12/20/1999 at 65 Years.  Surgery (SNOMED CT 549042646) on 4/29/1999 at 65 Years.  Comments:  6/21/2016 9:49 AM - Hali Yepez  Right exploratory tympanotomy transcanal, atticotomy, stapedectomy with insertion of 4.0 Schuknecht piston wire prosthesis    5/4/2010 1:32 PM - Kamala Proctor LPN  right ear   Social History:        Alcohol Assessment            Never      Tobacco Assessment            Never      Substance Abuse Assessment            Never      Employment and Education Assessment            Retired, Work/School description: [employer].   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      Home and Environment Assessment            Marital status: .  2 children.      Exercise and Physical Activity Assessment            Exercise type: Running.        Physical Examination   Vital Signs   7/18/2017 11:28 AM CDT Peripheral Pulse Rate 72 bpm    Pulse Site Radial artery    HR Method Manual    Systolic Blood Pressure 132 mmHg    Diastolic Blood Pressure 74  mmHg    Mean Arterial Pressure 93 mmHg    BP Site Left arm    BP Method Manual      Measurements from flowsheet : Measurements   7/18/2017 11:28 AM CDT Height Measured - Standard 66.5 in    Weight Measured - Standard 206.0 lb    BSA 2.09 m2    Body Mass Index 32.75 kg/m2      General:  Alert and oriented X 3, No acute distress, Warm, Pink, Intact.         Appearance: Within normal limits, Well nourished, Calm.         Hydration: Within normal limits.         Psych: Within normal limits, Appropriate mood and affect, Cooperative, Normal judgment.    Integumentary:  bilateral first MTP joint swelling and erythema.

## 2022-02-16 NOTE — LETTER
(Inserted Image. Unable to display)     May 06, 2019      TREVOR MILLERL  [address]  [place], [place] [id number]          Dear TREVOR,      Thank you for selecting [facility] ([facility]viously [facility]) for your healthcare needs.    Our records indicate you are due for the following services:    Diabetic Exam ~ Please bring your glucose meter and/or your blood glucose diary to your appointment.  Hypertension check ~ please remember to bring your at-home blood pressure readings with you to your appointment.   Fasting Lab Tests ~ Please do not eat or drink anything 10 hours prior to your scheduled appointment time.  (Water and any medications that you may need are allowed unless directed otherwise.)    If you had your labs done at another facility or with Direct Access Lab Testing at [facility], please bring in a copy of the results to your next visit, mail a copy, or drop off a copy of your results to your Healthcare Provider.    You are due for lab work and an office visit; please schedule the lab appointment 1 week before the office visit.  This will assure all results are available to discuss with your provider during your visit.    **It is very helpful if you bring your medication bottles to your appointment.  This assures we have all of your current medications, including strength and dosing information, documented accurately in your medical record.    To schedule an appointment or if you have further questions, please contact your primary clinic:   [facility]       [phone]   [facility]       [phone]              [facility]     [phone]      Powered by app[employer]    Sincerely,    Chaz Fair MD

## 2022-02-16 NOTE — PROGRESS NOTES
Patient:   TREVOR DWYER            MRN: [id number]            FIN: [id number]               Age:   83 years     Sex:  Male     :  1934   Associated Diagnoses:   Transient ischemic attack   Author:   Chaz Silva PA-C      Chief Complaint   3/21/2017 2:03 PM CDT    Pt here for post hosp        History of Present Illness   He was seen in [facility] ED 3/18 and was transferred to [facility] and held overnight  for confusion and word finding difficulty  sxs were resolving while at [facility] ED, labs and CT were normal  carotid U/S was done (results not available), he was started on atorvastatin  and will be seeing neurology,  that appt needs to be set up yet  he was diagnosed with a TIA    during episode there was no muscle weakness      Review of Systems   Constitutional:  Negative.    Ear/Nose/Mouth/Throat:  Negative.    Respiratory:  Negative.    Cardiovascular:  Negative.    Gastrointestinal:  Negative.       Health Status   Allergies:    Allergic Reactions (Selected)  Severity Not Documented  Lisinopril (Elevated creatinine)  Nonallergic Reactions (Selected)  Severity Not Documented  Aricept (Ill)   Medications:  (Selected)   Prescriptions  Prescribed  Metoprolol Tartrate 25 mg oral tablet: ( 12.5 mg ), po, bid, # 90 tab(s), 1 Refill(s), Type: Maintenance  Miscellaneous Rx Supply: 1 Touch Ultra test Strips, See Instructions, Instructions: BID, Supply, # 180 EA, 1 Refill(s), Type: Maintenance  One Touch Ultra Mini Meter: One Touch Ultra Mini Meter, See Instructions, Instructions: Use as directed., Supply, # 1 kit(s), 0 Refill(s), Type: Maintenance  Vitamin B12 1000 mcg oral tablet: ( 1,000 mcg ), po, daily, # 90 tab(s), 1 Refill(s), Type: Maintenance  aspirin 81 mg oral tablet: 1 tab(s) ( 81 mg ), po, daily, # 90 tab(s), 1 Refill(s), Type: Maintenance, faxed to pharmacy (Rx), [facility]; fax# [phone] '; phonw # [phone]  hydroCHLOROthiazide 25 mg oral tablet: 0.5 tab(s) ( 12.5 mg ), PO, Daily, # 45  tab(s), 1 Refill(s), Type: Maintenance  metFORMIN 500 mg oral tablet, extended release: 1 tab(s) ( 500 mg ), PO, bid, # 180 tab(s), 1 Refill(s), Type: Maintenance  nitroglycerin 0.4 mg sublingual tablet: See Instructions, Instructions: 1 tab(s) SL q5min  (not to exceed 3 doses/15 min--if pain persists, seek medical attention), PRN:  for chest pain, # 25 tab(s), 1 Refill(s), Type: Maintenance  warfarin 3 mg oral tablet: See Instructions, Instructions: Take one (1) tablet daily as directed by physician, # 60 EA, 1 Refill(s), Type: Maintenance, Pharmacy: [facility]  Documented Medications  Documented  atorvastatin 10 mg oral tablet: 1 tab(s) ( 10 mg ), po, daily, 0 Refill(s), Type: Maintenance  warfarin 1 mg oral tablet: 1 tab(s) ( 1 mg ), PO, Daily, # 90 tab(s), 0 Refill(s), Type: Maintenance   Problem list:    All Problems (Selected)  HTN (hypertension) / SNOMED CT 0095895537 / Confirmed  History of fracture of rib / SNOMED CT 1382351995 / Confirmed  Diabetes Mellitus / ICD-9- / Confirmed  Onychomycosis / SNOMED CT 4982288513 / Confirmed  ATRIAL FIBRILLATION / SNOMED CT 9824357032 / Confirmed  Dyslipidemia / ICD-9-.4 / Confirmed  Obesity / ICD-9-.00 / Probable  Cardiac pacemaker in situ / SNOMED CT 1139328148 / Confirmed  JAZLYN (Obstructive Sleep Apnea) / ICD-9-.23 / Confirmed  Transient ischemic attack (TIA) / SNOMED CT 123488726 / Confirmed  MI (Myocardial Infarction) / ICD-9-.90 / Confirmed  CAD (Coronary Artery Disease) / ICD-9-.00 / Confirmed  Dementia / SNOMED CT 062898314480342 / Confirmed  Memory Loss or Impairment / ICD-9-.93 / Confirmed      Histories   Past Medical History:    Active  Cardiac pacemaker in situ (1643005086): Onset in 2008 at 74 years.  History of fracture of rib (0375299692): Onset in the month of 10/1997 at 63 years  Comments:  6/21/2016 CDT 9:44 AM CDT - Hali Yepez  Left 11th rib  ATRIAL FIBRILLATION (3107813436)  CAD (Coronary Artery  Disease) (414.00)  MI (Myocardial Infarction) (410.90)  Comments:  2010 CDT 1:24 PM CDT - Kamala Proctor LPN  subendocardial  HTN (hypertension) (8927439301)  Dyslipidemia (272.4)  JAZLYN (Obstructive Sleep Apnea) (327.23)  Obesity (278.00)  Resolved  Inpatient stay (229738619): Onset on 2017 at 82 years.  Resolved on 1/10/2017 at 82 years.  Comments:  2017 CST 4:59 PM Hali Tran  @[facility] TIA  Inpatient stay (536684715): Onset on 3/22/2014 at 80 years.  Resolved.  Comments:  2017 CST 5:00 PM Hali Tran  @[place] - Chest pain  Inpatient stay (824982590): Onset on 2010 at 76 years.  Resolved.  Comments:  2017 CST 4:59 PM Hali Tran  @[facility]   Family History:    Cancer  Father ()  Comments:  2010 8:56 AM - Apple Dick [place]  CA - Cancer of colon  Mother ()     Procedure history:    Colonoscopy (321928899) on 2015 at 81 Years.  Comments:  2015 10:48 AM - Debi Palmer RN  Sedation: MAC  Indication: positive cologuard, personal history of colon polyps, family history of colon cancer  Diverticulosis in the sigmoid & descending colon.  Repeat only if medically indicated.  Screening for malignant neoplasm of colon (6880347505) on 3/10/2015 at 81 Years.  Comments:  3/19/2015 10:41 AM - Marisela Boss  Cologuard is positive.  Colonoscopy on 3/8/2012 at 78 Years.  Comments:  2014 9:43 AM - Kayla Win CMA  Colonoscopy with Dr Matamoros  Sedation: MAC  Colonoscopy (754861709) on 2009 at 75 Years.  Pacemaker catheter, device (9580966299) on 10/15/2008 at 74 Years.  Stenting of pulmonary vein (918624249) in  at 70 Years.  Colonoscopy (738727130) in  at 68 Years.  Colonoscopy (553944539) on 1999 at 65 Years.  Surgery (185449879) on 1999 at 65 Years.  Comments:  2016 9:49 AM - Hali Yepez  Right exploratory tympanotomy transcanal, atticotomy, stapedectomy with insertion of 4.0  Jose Ramon piston wire prosthesis    5/4/2010 1:32 PM - Kamala Proctor LPN  right ear   Social History:        Alcohol Assessment            Never      Tobacco Assessment            Never      Substance Abuse Assessment            Never      Employment and Education Assessment            Retired, Work/School description: [employer].   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      Home and Environment Assessment            Marital status: .  2 children.      Exercise and Physical Activity Assessment            Exercise type: Running.        Physical Examination   Vital Signs   3/21/2017 2:03 PM CDT Peripheral Pulse Rate 72 bpm    Pulse Site Radial artery    HR Method Manual    Systolic Blood Pressure 136 mmHg    Diastolic Blood Pressure 70 mmHg    Mean Arterial Pressure 92 mmHg    BP Site Left arm    BP Method Manual      Measurements from flowsheet : Measurements   3/21/2017 2:03 PM CDT    Weight Measured - Standard                209.8 lb     General:  No acute distress.    Eye:  Pupils are equal, round and reactive to light, Extraocular movements are intact.    HENT:  No pharyngeal erythema.    Respiratory:  Lungs are clear to auscultation.    Cardiovascular:  Normal rate, Regular rhythm, No murmur.    Neurologic:  Cranial Nerves II-XII are grossly intact, Normal deep tendon reflexes, Romberg is negative.    Cognition and Speech:  Oriented, his speech is clear, but occassionally has trouble finding the right word.    Psychiatric:  Appropriate mood & affect.       Review / Management   Documentation reviewed:  ER notes.       Impression and Plan   Diagnosis     Transient ischemic attack (ULM91-BU G45.9).     Summary:  continue on current medications, follow up with neurolgy.    Orders     Orders   Charges (Evaluation and Management):  06004 office outpatient visit 25 minutes (Charge) (Order): Quantity: 1, Transient ischemic attack.

## 2022-02-16 NOTE — TELEPHONE ENCOUNTER
Entered by Linda Galvez on June 06, 2019 4:46:22 PM CDT  ---------------------  From: Linda Galvez   To: [facility]    Sent: 6/6/2019 4:46:22 PM CDT  Subject: Medication Management     ** Submitted: **  Order:Miscellaneous Prescription (OneTouch Ultra Test Strip)  See Instructions  Use as directed by physician  Qty:  100 EA        Days Supply:  0        Refills:  5          Substitutions Allowed     Route To [facility]    Signed by Linda Galvez  6/6/2019 4:46:00 PM    ** Not Approved:  **  Order:Miscellaneous Prescription (OneTouch Ultra Test Strip)  Use as directed by physician  Qty:  100 EA        Days Supply:  0        Refills:  0          Substitutions Allowed     Route To [facility]   Note from Pharmacy:  AMY NEEDS A NEW PRESCRIPTION FOR A CONTOUR NEXT BG TEST MACHINE AND STRIPS AND LANCETSFOR MEDICARE.  THEY ARE USING STRIPS THAT OUTDATED 2 YEARS AGO.  Signed by Linda Galvez            ** Patient matched by Linda Galvez on 6/6/2019 4:45:26 PM CDT **      ------------------------------------------  From: [facility]  To: Chaz Fair MD  Sent: June 6, 2019 12:54:23 PM CDT  Subject: Medication Management  Due: June 7, 2019 12:54:23 PM CDT    ** On Hold Pending Signature **  Drug: OneTouch Ultra Test Strip  Use as directed by physician  Quantity: 100 EA      Days Supply: 0         Refills: 0  Substitutions Allowed  Notes from Pharmacy: AMY NEEDS A NEW PRESCRIPTION FOR A CONTOUR NEXT BG TEST MACHINE AND STRIPS AND LANCETSFOR MEDICARE.  THEY ARE USING STRIPS THAT OUTDATED 2 YEARS AGO.    Dispensed Drug: OneTouch Ultra Test Strip  Use as directed by physician  Quantity: 100 EA      Days Supply: 0         Refills: 0  Substitutions Allowed  Notes from Pharmacy: AMY NEEDS A NEW PRESCRIPTION FOR A CONTOUR NEXT BG TEST MACHINE AND STRIPS AND LANCETSFOR MEDICARE.  THEY ARE USING STRIPS THAT OUTDATED 2 YEARS  AGO.  ------------------------------------------

## 2022-02-16 NOTE — PROGRESS NOTES
Patient:   TREVOR DWYER            MRN: [id number]            FIN: [id number]               Age:   83 years     Sex:  Male     :  1934   Associated Diagnoses:   CAD (Coronary Artery Disease); ATRIAL FIBRILLATION; Dyslipidemia; HTN (hypertension); Diabetes Mellitus; BPH (benign prostatic hyperplasia)   Author:   Chaz Fair MD      Impression and Plan   Diagnosis     CAD (Coronary Artery Disease) (FXE32-FA I25.10).     Course:  Progressing as expected.    Orders     Orders   Charges (Evaluation and Management):  99168 office outpatient visit 25 minutes (Charge) (Order): Quantity: 1, ATRIAL FIBRILLATION  Dyslipidemia  CAD (Coronary Artery Disease)  HTN (hypertension)  Diabetes Mellitus.     Orders (Selected)   Prescriptions  Prescribed  aspirin 81 mg oral tablet: 1 tab(s) ( 81 mg ), po, daily, # 90 tab(s), 1 Refill(s), Type: Maintenance, faxed to pharmacy (Rx), [facility]# [phone] '; phonw # [phone]  nitroglycerin 0.4 mg sublingual tablet: See Instructions, Instructions: 1 tab(s) SL q5min  (not to exceed 3 doses/15 min--if pain persists, seek medical attention), PRN:  for chest pain, # 25 tab(s), 1 Refill(s), Type: Maintenance.     Diagnosis     ATRIAL FIBRILLATION (JRS18-GD I48.2).     Course:  Well controlled.    Orders     Orders (Selected)   Prescriptions  Prescribed  Metoprolol Tartrate 25 mg oral tablet: ( 12.5 mg ), po, bid, # 90 tab(s), 1 Refill(s), Type: Maintenance  warfarin 1 mg oral tablet: See Instructions, Instructions: as directed, # 100 tab(s), 1 Refill(s), Type: Maintenance  warfarin 3 mg oral tablet: See Instructions, Instructions: Take one (1) tablet daily as directed by physician, # 60 EA, 5 Refill(s), Type: Maintenance, Pharmacy: [facility], Take one (1) tablet daily as directed by physician.     Diagnosis     Dyslipidemia (JDP96-BV E78.5).     Course:  Progressing as expected.    Diagnosis     HTN (hypertension) (IKR31-SV I10).     Course:   Well controlled.    Orders     Orders (Selected)   Prescriptions  Prescribed  hydroCHLOROthiazide 25 mg oral tablet: 0.5 tab(s) ( 12.5 mg ), PO, Daily, # 45 tab(s), 1 Refill(s), Type: Maintenance.     Diagnosis     Diabetes Mellitus (VDY46-XF E11.9).     Course:  Progressing as expected.    Orders     Orders (Selected)   Prescriptions  Prescribed  metFORMIN 500 mg oral tablet, extended release: 1 tab(s) ( 500 mg ), PO, bid, # 180 tab(s), 1 Refill(s), Type: Maintenance.     Diagnosis     BPH (benign prostatic hyperplasia) (KWF36-SE N40.0).     Course:  Improving.    Orders     Orders (Selected)   Documented Medications  Documented  finasteride 5 mg oral tablet: 1 tab(s) ( 5 mg ), po, daily, 0 Refill(s), Type: Maintenance  tamsulosin 0.4 mg oral capsule: 1 cap(s) ( 0.4 mg ), po, daily, 0 Refill(s), Type: Maintenance.        Visit Information      Date of Service: 11/02/2017 08:36 am  Performing Location: [facility]  Encounter#: [id number]      Primary Care Provider (PCP):  Chaz Fair MD    NPI# [id number]   Visit type:  Scheduled follow-up.    Accompanied by:  Spouse.    Source of history:  Self, Spouse.    Referral source:  Self.    History limitation:  None.       Chief Complaint   Chief complaint discussed and confirmed correct.     11/2/2017 8:37 AM CDT    Pt here for med ck and labwork        History of Present Illness             The patient presents for follow-up evaluation of hypertension.  The quality of hypertension symptom(s) since the patient's last visit is described as being unchanged.  The severity of the hypertension symptom(s) since the last visit is moderate.  Since the patient's last visit, the timing/course of hypertension symptom(s) is constant.  Exacerbating factors consist of none.  Relieving factors consist of medication.  Associated symptoms consist of none.  Prior treatment consists of lifestyle modification (weight reduction, dietary sodium restriction, increased  physical activity, adoption of DASH eating plan).  Medical encounters: none.  Compliance problems: none.        Interval History   Atrial Fibrillation   Episodes of palpitations unchanged.  The course is progressing as expected and well controlled.  The effect on daily activities is change in activity level and change in work/school duties.  Associated symptoms characterized by fatigue, palpitations, tachycardia and shortness of breath, no dizziness or faintness.     Cholesterol Management   Total cholesterol results < 200 mg/dL (optimal).  LDL cholesterol results < 100 mg/dL (optimal).  Triglyceride results < 150 mg/dL (normal).  The course is progressing as expected.  The effect on daily activities is no change in activity level and no change in eating habits.  Associated symptoms characterized by no fatigue, chest pain, joint pain, muscle weakness or myalgias.     Ischemic Heart Disease   Anginal episodes none.  The course is progressing as expected.  The effect on daily activities is no change in activity level, no change in household functions and no change in work/school duties.  Associated symptoms characterized by no fatigue, weight gain, chest pain, edema: peripheral or shortness of breath.        Review of Systems   Constitutional:  Weakness, Fatigue, Decreased activity.    Eye:  Negative.    Ear/Nose/Mouth/Throat:  Negative.    Respiratory:  Negative.    Cardiovascular:  Negative.    Gastrointestinal:  Negative.    Genitourinary:  Negative.    Hematology/Lymphatics:  Negative.    Endocrine:  Negative.    Immunologic:  Negative.    Musculoskeletal:  Negative, severe spinal stenosis, rib pain due to recent fall.    Integumentary:  Negative.    Neurologic:  frequent falls due to dementia and spinal stenosis.    Psychiatric:  Negative.    All other systems reviewed and negative      Health Status   Allergies:    Allergic Reactions (Selected)  Severity Not Documented  Lisinopril (Elevated  creatinine)  Nonallergic Reactions (Selected)  Severity Not Documented  Aricept (Ill)   Medications:  (Selected)   Prescriptions  Prescribed  Metoprolol Tartrate 25 mg oral tablet: ( 12.5 mg ), po, bid, # 90 tab(s), 1 Refill(s), Type: Maintenance  Miscellaneous Rx Supply: 1 Touch Ultra test Strips, See Instructions, Instructions: BID, Supply, # 180 EA, 1 Refill(s), Type: Maintenance  One Touch Ultra Mini Meter: One Touch Ultra Mini Meter, See Instructions, Instructions: Use as directed., Supply, # 1 kit(s), 0 Refill(s), Type: Maintenance  Vitamin B12 1000 mcg oral tablet: ( 1,000 mcg ), po, daily, # 90 tab(s), 1 Refill(s), Type: Maintenance  aspirin 81 mg oral tablet: 1 tab(s) ( 81 mg ), po, daily, # 90 tab(s), 1 Refill(s), Type: Maintenance, faxed to pharmacy (Rx), [facility]; [facility]# [phone] '; phonw # [phone]  hydroCHLOROthiazide 25 mg oral tablet: 0.5 tab(s) ( 12.5 mg ), PO, Daily, # 45 tab(s), 1 Refill(s), Type: Maintenance  metFORMIN 500 mg oral tablet, extended release: 1 tab(s) ( 500 mg ), PO, bid, # 180 tab(s), 1 Refill(s), Type: Maintenance  nitroglycerin 0.4 mg sublingual tablet: See Instructions, Instructions: 1 tab(s) SL q5min  (not to exceed 3 doses/15 min--if pain persists, seek medical attention), PRN:  for chest pain, # 25 tab(s), 1 Refill(s), Type: Maintenance  warfarin 1 mg oral tablet: See Instructions, Instructions: as directed, # 100 tab(s), 1 Refill(s), Type: Maintenance  warfarin 3 mg oral tablet: See Instructions, Instructions: Take one (1) tablet daily as directed by physician, # 60 EA, 5 Refill(s), Type: Maintenance, Pharmacy: [facility], Take one (1) tablet daily as directed by physician  Documented Medications  Documented  Multi-vitamin: Multi-vitamin, See Instructions, Supply, 0 Refill(s), Type: Maintenance  finasteride 5 mg oral tablet: 1 tab(s) ( 5 mg ), po, daily, 0 Refill(s), Type: Maintenance  tamsulosin 0.4 mg oral capsule: 1 cap(s) ( 0.4 mg ), po,  daily, 0 Refill(s), Type: Maintenance   Problem list:    All Problems  Acute gout / SNOMED CT 755139062 / Confirmed  ATRIAL FIBRILLATION / SNOMED CT 5923721103 / Confirmed  CAD (Coronary Artery Disease) / ICD-9-.00 / Confirmed  Cardiac pacemaker in situ / SNOMED CT 4479960067 / Confirmed  Dementia / SNOMED CT 197160056244373 / Confirmed  Diabetes Mellitus / ICD-9- / Confirmed  Dyslipidemia / ICD-9-.4 / Confirmed  History of fracture of rib / SNOMED CT 5678738042 / Confirmed  HTN (hypertension) / SNOMED CT 1553952691 / Confirmed  Memory Loss or Impairment / ICD-9-.93 / Confirmed  MI (Myocardial Infarction) / ICD-9-.90 / Confirmed  Obesity / ICD-9-.00 / Probable  Onychomycosis / SNOMED CT 1528753214 / Confirmed  JAZLYN (Obstructive Sleep Apnea) / ICD-9-.23 / Confirmed  Transient ischemic attack (TIA) / SNOMED CT 333682831 / Confirmed  Resolved: Inpatient stay / SNOMED CT 543281474  Resolved: Inpatient stay / SNOMED CT 612530328  Resolved: Inpatient stay / SNOMED CT 198029408      Histories   Past Medical History:    Active  Cardiac pacemaker in situ (3627398247): Onset in 2008 at 74 years.  History of fracture of rib (1704446640): Onset in the month of 10/1997 at 63 years  Comments:  6/21/2016 CDT 9:44 AM CDT - Hali Yepez  Left 11th rib  ATRIAL FIBRILLATION (5238936979)  CAD (Coronary Artery Disease) (414.00)  MI (Myocardial Infarction) (410.90)  Comments:  5/4/2010 CDT 1:24 PM CDT - Kamala Prcotor LPN  subendocardial  HTN (hypertension) (9321320565)  Dyslipidemia (272.4)  JAZLYN (Obstructive Sleep Apnea) (327.23)  Obesity (278.00)  Resolved  Inpatient stay (942941277): Onset on 1/8/2017 at 82 years.  Resolved on 1/10/2017 at 82 years.  Comments:  1/16/2017 CST 4:59 PM CST - Hali Yepez  @[facility] - TIA  Inpatient stay (903238432): Onset on 3/22/2014 at 80 years.  Resolved.  Comments:  1/16/2017 CST 5:00 PM CST - Hali Yepez  @[place] - Chest pain  Inpatient stay  (366155738): Onset on 2010 at 76 years.  Resolved.  Comments:  2017 CST 4:59 PM CST - Hali Yepez  @[facility]   Family History:    Cancer  Father ()  Comments:  2010 8:56 AM - Apple Dick  Bladder CA  CA - Cancer of colon  Mother ()     Procedure history:    Colonoscopy (SNOMED CT 722416764) performed by Boni Garcia on 2015 at 81 Years.  Comments:  2015 10:48 AM - Estela HOFFMANN, Debi  Sedation: MAC  Indication: positive cologuard, personal history of colon polyps, family history of colon cancer  Diverticulosis in the sigmoid & descending colon.  Repeat only if medically indicated.  Screening for malignant neoplasm of colon (SNOMED CT 2713623665) on 3/10/2015 at 81 Years.  Comments:  3/19/2015 10:41 AM - Marisela Boss  Cologuard is positive.  Colonoscopy on 3/8/2012 at 78 Years.  Comments:  2014 9:43 AM - Kayla Win CMA  Colonoscopy with Dr Matamoros  Sedation: MAC  Colonoscopy (SNOMED CT 035036341) performed by Jeronimo Matamoros MD on 2009 at 75 Years.  Pacemaker catheter, device (SNOMED CT 5812774484) on 10/15/2008 at 74 Years.  Stenting of pulmonary vein (SNOMED CT 435648570) in  at 70 Years.  Colonoscopy (SNOMED CT 824978312) in  at 68 Years.  Colonoscopy (SNOMED CT 834305219) performed by Leonardo Damon MD on 1999 at 65 Years.  Surgery (SNOMED CT 289103925) on 1999 at 65 Years.  Comments:  2016 9:49 AM - Hali Yepez  Right exploratory tympanotomy transcanal, atticotomy, stapedectomy with insertion of 4.0 Schuknecht piston wire prosthesis    2010 1:32 PM - Kamala Proctor LPN  right ear   Social History:        Alcohol Assessment            Never      Tobacco Assessment            Never      Substance Abuse Assessment            Never      Employment and Education Assessment            Retired, Work/School description: [employer].   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      Home and Environment  Assessment            Marital status: .  2 children.      Exercise and Physical Activity Assessment            Exercise type: Running.        Physical Examination   Vital signs reviewed  and within acceptable limits    Vital Signs   11/2/2017 8:37 AM CDT Peripheral Pulse Rate 63 bpm    Pulse Site Radial artery    Systolic Blood Pressure 124 mmHg    Diastolic Blood Pressure 66 mmHg    Mean Arterial Pressure 85 mmHg    BP Site Right arm    Oxygen Saturation 97 %      Measurements from flowsheet : Measurements   11/2/2017 8:37 AM CDT Height Measured - Standard 66.5 in    Weight Measured - Standard 205.8 lb    BSA 2.09 m2    Body Mass Index 32.72 kg/m2      General:  No acute distress.    Neck:  Supple, No lymphadenopathy, No thyromegaly.    Respiratory:  Lungs are clear to auscultation, Respirations are non-labored, Breath sounds are equal, Symmetrical chest wall expansion.    Cardiovascular:  Normal rate, No murmur, No gallop, Good pulses equal in all extremities, Normal peripheral perfusion, No edema, irregular rythm.    Gastrointestinal:  Soft, Non-tender, Non-distended, Normal bowel sounds, No organomegaly.    Integumentary:  Warm, Dry, Pink, No pedal skin lesions.    Neurologic:  Alert, Oriented, normal sensation to monofilament.    Psychiatric:  Cooperative, Appropriate mood & affect.       Review / Management   Results review

## 2022-02-16 NOTE — NURSING NOTE
"Comprehensive Intake Entered On:  6/11/2019 1:03 PM CDT    Performed On:  6/11/2019 12:55 PM CDT by Vivian Adam CMA               Summary   Chief Complaint :   Pt here to \"touch base\" [facility]   Advance Directive :   Yes   Weight Measured :   171 lb(Converted to: 171 lb 0 oz, 77.56 kg)    Height Measured :   66.5 in(Converted to: 5 ft 6 in, 168.91 cm)    Body Mass Index :   27.18 kg/m2 (HI)    Body Surface Area :   1.91 m2   Systolic Blood Pressure :   128 mmHg   Diastolic Blood Pressure :   78 mmHg   Mean Arterial Pressure :   95 mmHg   Peripheral Pulse Rate :   54 bpm (LOW)    Oxygen Saturation :   97 %   Race :      Languages :   English   Ethnicity :   Not  or    Vivian Adam CMA - 6/11/2019 12:55 PM CDT   Health Status   Allergies Verified? :   Yes   Medication History Verified? :   Yes   Immunizations Current :   Yes   Medical History Verified? :   Yes   Pre-Visit Planning Status :   Completed   Tobacco Use? :   Former smoker   Vivian Adam CMA - 6/11/2019 12:55 PM CDT   Consents   Consent for Immunization Exchange :   Consent Granted   Consent for Immunizations to Providers :   Consent Granted   Vivian Adam CMA - 6/11/2019 12:55 PM CDT   Meds / Allergies   (As Of: 6/11/2019 1:03:04 PM CDT)   Allergies (Active)   Aricept  Estimated Onset Date:   <not entered> 2015 ; Reactions:   ill ; Created By:   hCaz Fair MD; Reaction Status:   Active ; Category:   Drug ; Substance:   Aricept ; Type:   Intolerance ; Updated By:   Chaz Fair MD; Reviewed Date:   6/11/2019 1:01 PM CDT      lisinopril  Estimated Onset Date:   <not entered> 2012 ; Reactions:   elevated creatinine ; Created By:   Chaz Fair MD; Reaction Status:   Active ; Category:   Drug ; Substance:   lisinopril ; Type:   Allergy ; Updated By:   Chaz Fair MD; Reviewed Date:   6/11/2019 1:01 PM CDT        Medication List   (As Of: 6/11/2019 1:03:04 PM CDT)   Prescription/Discharge " Order    aspirin  :   aspirin ; Status:   Prescribed ; Ordered As Mnemonic:   aspirin 81 mg oral tablet ; Simple Display Line:   81 mg, 1 tab(s), po, daily, 90 tab(s), 1 Refill(s) ; Ordering Provider:   Chaz Fair MD; Catalog Code:   aspirin ; Order Dt/Tm:   2/29/2016 9:47:10 AM          cyanocobalamin  :   cyanocobalamin ; Status:   Prescribed ; Ordered As Mnemonic:   Vitamin B12 1000 mcg oral tablet ; Simple Display Line:   1,000 mcg, po, daily, 90 tab(s), 1 Refill(s) ; Ordering Provider:   Chaz Fair MD; Catalog Code:   cyanocobalamin ; Order Dt/Tm:   12/19/2016 11:30:00 AM          hydroCHLOROthiazide  :   hydroCHLOROthiazide ; Status:   Prescribed ; Ordered As Mnemonic:   hydroCHLOROthiazide 25 mg oral tablet ; Simple Display Line:   12.5 mg, 0.5 tab(s), PO, Daily, 45 tab(s), 1 Refill(s) ; Ordering Provider:   Chaz Fair MD; Catalog Code:   hydroCHLOROthiazide ; Order Dt/Tm:   11/2/2017 9:03:32 AM          metFORMIN  :   metFORMIN ; Status:   Prescribed ; Ordered As Mnemonic:   metFORMIN 500 mg oral tablet, extended release ; Simple Display Line:   500 mg, 1 tab(s), PO, bid, 180 tab(s), 1 Refill(s) ; Ordering Provider:   Chaz Fair MD; Catalog Code:   metFORMIN ; Order Dt/Tm:   11/2/2017 9:03:08 AM          metoprolol  :   metoprolol ; Status:   Prescribed ; Ordered As Mnemonic:   Metoprolol Tartrate 25 mg oral tablet ; Simple Display Line:   12.5 mg, po, bid, 90 tab(s), 1 Refill(s) ; Ordering Provider:   Chaz Fair MD; Catalog Code:   metoprolol ; Order Dt/Tm:   11/2/2017 9:03:40 AM          Miscellaneous Prescription  :   Miscellaneous Prescription ; Status:   Prescribed ; Ordered As Mnemonic:   OneTouch Ultra Test Strip ; Simple Display Line:   See Instructions, Use as directed by physician, 100 EA, 5 Refill(s) ; Ordering Provider:   Chaz Fair MD; Catalog Code:   Miscellaneous Prescription ; Order Dt/Tm:   6/6/2019 4:46:02 PM          Miscellaneous Rx Supply  :   Miscellaneous Rx  Supply ; Status:   Prescribed ; Ordered As Mnemonic:   Miscellaneous Rx Supply ; Simple Display Line:   See Instructions, BID, 180 EA, 1 Refill(s) ; Ordering Provider:   Chaz Fair MD; Catalog Code:   Miscellaneous Rx Supply ; Order Dt/Tm:   11/19/2015 2:25:08 PM          Miscellaneous Rx Supply  :   Miscellaneous Rx Supply ; Status:   Prescribed ; Ordered As Mnemonic:   One Touch Ultra Mini Meter ; Simple Display Line:   See Instructions, Use as directed., 1 kit(s) ; Ordering Provider:   Chaz Fair MD; Catalog Code:   Miscellaneous Rx Supply ; Order Dt/Tm:   6/2/2014 3:26:39 PM          nitroglycerin  :   nitroglycerin ; Status:   Prescribed ; Ordered As Mnemonic:   nitroglycerin 0.4 mg sublingual tablet ; Simple Display Line:   See Instructions, 1 tab(s) SL q5min  (not to exceed 3 doses/15 min--if pain persists, seek medical attention), PRN:  for chest pain, 25 tab(s), 1 Refill(s) ; Ordering Provider:   Chaz Fair MD; Catalog Code:   nitroglycerin ; Order Dt/Tm:   6/19/2017 11:04:18 AM          warfarin  :   warfarin ; Status:   Prescribed ; Ordered As Mnemonic:   warfarin 1 mg oral tablet ; Simple Display Line:   See Instructions, take 0.5 tab  po daily on Tuesday, Wednesday,Thursday, Saturday, and Sunday, 60 tab(s), 3 Refill(s) ; Ordering Provider:   Chaz Fair MD; Catalog Code:   warfarin ; Order Dt/Tm:   3/8/2018 4:30:31 PM ; Comment:   [facility]          warfarin  :   warfarin ; Status:   Prescribed ; Ordered As Mnemonic:   warfarin 3 mg oral tablet ; Simple Display Line:   3 mg, 1 tab(s), po, daily, 90 tab(s), 3 Refill(s) ; Ordering Provider:   Chaz Fair MD; Catalog Code:   warfarin ; Order Dt/Tm:   3/8/2018 4:29:44 PM            Home Meds    finasteride  :   finasteride ; Status:   Documented ; Ordered As Mnemonic:   finasteride 5 mg oral tablet ; Simple Display Line:   5 mg, 1 tab(s), po, daily, 0 Refill(s) ; Catalog Code:   finasteride ; Order Dt/Tm:   11/2/2017 8:41:52  AM          Miscellaneous Prescription  :   Miscellaneous Prescription ; Status:   Documented ; Ordered As Mnemonic:   Multi-vitamin ; Simple Display Line:   See Instructions, 0 Refill(s) ; Catalog Code:   Miscellaneous Prescription ; Order Dt/Tm:   6/19/2017 10:29:20 AM          predniSONE  :   predniSONE ; Status:   Documented ; Ordered As Mnemonic:   predniSONE ; Simple Display Line:   Oral, daily, 0 Refill(s) ; Catalog Code:   predniSONE ; Order Dt/Tm:   6/11/2019 1:01:31 PM          tamsulosin  :   tamsulosin ; Status:   Documented ; Ordered As Mnemonic:   tamsulosin 0.4 mg oral capsule ; Simple Display Line:   0.4 mg, 1 cap(s), po, daily, 0 Refill(s) ; Catalog Code:   tamsulosin ; Order Dt/Tm:   11/2/2017 8:41:41 AM          traMADol  :   traMADol ; Status:   Documented ; Ordered As Mnemonic:   traMADol ; Simple Display Line:   Oral, 0 Refill(s) ; Catalog Code:   traMADol ; Order Dt/Tm:   6/11/2019 1:01:38 PM

## 2022-02-16 NOTE — PROGRESS NOTES
Patient:   TREVOR DWYER            MRN: [id number]            FIN: [id number]               Age:   83 years     Sex:  Male     :  1934   Associated Diagnoses:   CAD (Coronary Artery Disease); ATRIAL FIBRILLATION; Dyslipidemia; HTN (hypertension); Diabetes Mellitus   Author:   Chaz Fair MD      Impression and Plan   Diagnosis     CAD (Coronary Artery Disease) (MGX91-IW I25.10).     Course:  Progressing as expected.    Orders     Orders   Charges (Evaluation and Management):  05925 office outpatient visit 25 minutes (Charge) (Order): Quantity: 1, ATRIAL FIBRILLATION  CAD (Coronary Artery Disease)  HTN (hypertension)  Dyslipidemia  Diabetes Mellitus.     Orders (Selected)   Prescriptions  Prescribed  aspirin 81 mg oral tablet: 1 tab(s) ( 81 mg ), po, daily, # 90 tab(s), 1 Refill(s), Type: Maintenance, faxed to pharmacy (Rx), [facility]; fax# [phone] '; phonw # [phone]  nitroglycerin 0.4 mg sublingual tablet: See Instructions, Instructions: 1 tab(s) SL q5min  (not to exceed 3 doses/15 min--if pain persists, seek medical attention), PRN:  for chest pain, # 25 tab(s), 1 Refill(s), Type: Maintenance.     Diagnosis     ATRIAL FIBRILLATION (ZRD38-RT I48.2).     Course:  Well controlled.    Orders     Orders (Selected)   Prescriptions  Prescribed  Metoprolol Tartrate 25 mg oral tablet: ( 12.5 mg ), po, bid, # 90 tab(s), 1 Refill(s), Type: Maintenance  warfarin 3 mg oral tablet: See Instructions, Instructions: Take one (1) tablet daily as directed by physician, # 60 EA, 5 Refill(s), Type: Maintenance, Pharmacy: [facility], Take one (1) tablet daily as directed by physician.     Diagnosis     Dyslipidemia (QAV30-UJ E78.5).     Course:  Progressing as expected.    Orders     Orders (Selected)   Documented Medications  Completed  atorvastatin 10 mg oral tablet: 1 tab(s) ( 10 mg ), po, daily, 0 Refill(s), Type: Maintenance.     Diagnosis     HTN (hypertension) (OPS56-OK I10).      Course:  Well controlled.    Orders     Orders (Selected)   Prescriptions  Prescribed  hydroCHLOROthiazide 25 mg oral tablet: 0.5 tab(s) ( 12.5 mg ), PO, Daily, # 45 tab(s), 1 Refill(s), Type: Maintenance.     Diagnosis     Diabetes Mellitus (QWA08-GA E11.9).     Course:  Progressing as expected.    Orders     Orders (Selected)   Prescriptions  Prescribed  metFORMIN 500 mg oral tablet, extended release: 1 tab(s) ( 500 mg ), PO, bid, # 180 tab(s), 1 Refill(s), Type: Maintenance.        Visit Information      Date of Service: 06/19/2017 10:09 am  Performing Location: [facility]  Encounter#: [id number]      Primary Care Provider (PCP):  Marv ALCANTAR, Chaz MOTA# [phone]   Visit type:  Scheduled follow-up.    Accompanied by:  Spouse.    Source of history:  Self, Spouse.    Referral source:  Self.    History limitation:  None.       Chief Complaint   Chief complaint discussed and confirmed correct.     6/19/2017 10:22 AM CDT   Pt here for med ck        History of Present Illness             The patient presents for follow-up evaluation of hypertension.  The quality of hypertension symptom(s) since the patient's last visit is described as being unchanged.  The severity of the hypertension symptom(s) since the last visit is moderate.  Since the patient's last visit, the timing/course of hypertension symptom(s) is constant.  Exacerbating factors consist of none.  Relieving factors consist of medication.  Associated symptoms consist of none.  Prior treatment consists of lifestyle modification (weight reduction, dietary sodium restriction, increased physical activity, adoption of DASH eating plan).  Medical encounters: none.  Compliance problems: none.        Interval History   Atrial Fibrillation   Episodes of palpitations unchanged.  The course is progressing as expected and well controlled.  The effect on daily activities is change in activity level and change in work/school duties.  Associated  symptoms characterized by fatigue, palpitations, tachycardia and shortness of breath, no dizziness or faintness.     Cholesterol Management   Total cholesterol results < 200 mg/dL (optimal).  LDL cholesterol results < 100 mg/dL (optimal).  Triglyceride results < 150 mg/dL (normal).  The course is progressing as expected.  The effect on daily activities is no change in activity level and no change in eating habits.  Associated symptoms characterized by no fatigue, chest pain, joint pain, muscle weakness or myalgias.     Ischemic Heart Disease   Anginal episodes none.  The course is progressing as expected.  The effect on daily activities is no change in activity level, no change in household functions and no change in work/school duties.  Associated symptoms characterized by no fatigue, weight gain, chest pain, edema: peripheral or shortness of breath.        Review of Systems   Constitutional:  Weakness, Fatigue, Decreased activity.    Eye:  Negative.    Ear/Nose/Mouth/Throat:  Negative.    Respiratory:  Negative.    Cardiovascular:  Negative.    Gastrointestinal:  Negative.    Genitourinary:  Negative.    Hematology/Lymphatics:  Negative.    Endocrine:  Negative.    Immunologic:  Negative.    Musculoskeletal:  Negative, severe spinal stenosis, rib pain due to recent fall.    Integumentary:  Negative.    Neurologic:  frequent falls due to dementia and spinal stenosis.    Psychiatric:  Negative.    All other systems reviewed and negative      Health Status   Allergies:    Allergic Reactions (Selected)  Severity Not Documented  Lisinopril (Elevated creatinine)  Nonallergic Reactions (Selected)  Severity Not Documented  Aricept (Ill)   Medications:  (Selected)   Prescriptions  Prescribed  Metoprolol Tartrate 25 mg oral tablet: ( 12.5 mg ), po, bid, # 90 tab(s), 1 Refill(s), Type: Maintenance  Miscellaneous Rx Supply: 1 Touch Ultra test Strips, See Instructions, Instructions: BID, Supply, # 180 EA, 1 Refill(s), Type:  Maintenance  One Touch Ultra Mini Meter: One Touch Ultra Mini Meter, See Instructions, Instructions: Use as directed., Supply, # 1 kit(s), 0 Refill(s), Type: Maintenance  Vitamin B12 1000 mcg oral tablet: ( 1,000 mcg ), po, daily, # 90 tab(s), 1 Refill(s), Type: Maintenance  aspirin 81 mg oral tablet: 1 tab(s) ( 81 mg ), po, daily, # 90 tab(s), 1 Refill(s), Type: Maintenance, faxed to pharmacy (Rx), [facility]; [facility]# [phone] '; phonw # [phone]  hydroCHLOROthiazide 25 mg oral tablet: 0.5 tab(s) ( 12.5 mg ), PO, Daily, # 45 tab(s), 1 Refill(s), Type: Maintenance  metFORMIN 500 mg oral tablet, extended release: 1 tab(s) ( 500 mg ), PO, bid, # 180 tab(s), 1 Refill(s), Type: Maintenance  nitroglycerin 0.4 mg sublingual tablet: See Instructions, Instructions: 1 tab(s) SL q5min  (not to exceed 3 doses/15 min--if pain persists, seek medical attention), PRN:  for chest pain, # 25 tab(s), 1 Refill(s), Type: Maintenance  warfarin 3 mg oral tablet: See Instructions, Instructions: Take one (1) tablet daily as directed by physician, # 60 EA, 5 Refill(s), Type: Maintenance, Pharmacy: [facility], Take one (1) tablet daily as directed by physician  Documented Medications  Documented  Multi-vitamin: Multi-vitamin, See Instructions, Supply, 0 Refill(s), Type: Maintenance  warfarin 1 mg oral tablet: 1 tab(s) ( 1 mg ), PO, Daily, # 90 tab(s), 0 Refill(s), Type: Maintenance   Problem list:    All Problems  ATRIAL FIBRILLATION / SNOMED CT 6140554472 / Confirmed  CAD (Coronary Artery Disease) / ICD-9-.00 / Confirmed  Cardiac pacemaker in situ / SNOMED CT 2295574461 / Confirmed  Dementia / SNOMED CT 428548040558311 / Confirmed  Diabetes Mellitus / ICD-9- / Confirmed  Dyslipidemia / ICD-9-.4 / Confirmed  History of fracture of rib / SNOMED CT 8098908975 / Confirmed  HTN (hypertension) / SNOMED CT 4330680242 / Confirmed  Memory Loss or Impairment / ICD-9-.93 / Confirmed  MI (Myocardial  Infarction) / ICD-9-.90 / Confirmed  Obesity / ICD-9-.00 / Probable  Onychomycosis / SNOMED CT 7606121006 / Confirmed  JAZLYN (Obstructive Sleep Apnea) / ICD-9-.23 / Confirmed  Transient ischemic attack (TIA) / SNOMED CT 162073906 / Confirmed  Resolved: Inpatient stay / SNOMED CT 616114211  Resolved: Inpatient stay / SNOMED CT 365885306  Resolved: Inpatient stay / SNOMED CT 574849412      Histories   Past Medical History:    Active  Cardiac pacemaker in situ (3961508744): Onset in  at 74 years.  History of fracture of rib (0826014055): Onset in the month of 10/1997 at 63 years  Comments:  2016 CDT 9:44 AM CDT - Hali Yepez  Left 11th rib  ATRIAL FIBRILLATION (3533368595)  CAD (Coronary Artery Disease) (414.00)  MI (Myocardial Infarction) (410.90)  Comments:  2010 CDT 1:24 PM CDT - Kamala Proctor LPN  subendocardial  HTN (hypertension) (6205108814)  Dyslipidemia (272.4)  JAZLYN (Obstructive Sleep Apnea) (327.23)  Obesity (278.00)  Resolved  Inpatient stay (539208599): Onset on 2017 at 82 years.  Resolved on 1/10/2017 at 82 years.  Comments:  2017 CST 4:59 PM Hali Tran  @[facility]  Inpatient stay (247459681): Onset on 3/22/2014 at 80 years.  Resolved.  Comments:  2017 CST 5:00 PM Hali Tran  @[place] - Chest pain  Inpatient stay (523880034): Onset on 2010 at 76 years.  Resolved.  Comments:  2017 CST 4:59 PM Hali Tran  @[facility]   Family History:    Cancer  Father ()  Comments:  2010 8:56 AM - Apple Dick  Bladder CA  CA - Cancer of colon  Mother ()     Procedure history:    Colonoscopy (SNOMED CT 313196354) performed by Boni Garcia on 2015 at 81 Years.  Comments:  2015 10:48 AM - Debi Palmer RN  Sedation: MAC  Indication: positive cologuard, personal history of colon polyps, family history of colon cancer  Diverticulosis in the sigmoid & descending colon.  Repeat only if medically  indicated.  Screening for malignant neoplasm of colon (SNOMED CT 3017088836) on 3/10/2015 at 81 Years.  Comments:  3/19/2015 10:41 AM - Marisela Boss is positive.  Colonoscopy on 3/8/2012 at 78 Years.  Comments:  12/12/2014 9:43 AM - Kayla Win CMA  Colonoscopy with Dr Matamoros  Sedation: MAC  Colonoscopy (SNOMED CT 631678732) performed by Jeronimo Matamoros MD on 12/8/2009 at 75 Years.  Pacemaker catheter, device (SNOMED CT 0494430317) on 10/15/2008 at 74 Years.  Stenting of pulmonary vein (SNOMED CT 770565408) in 2004 at 70 Years.  Colonoscopy (SNOMED CT 326661550) in 2002 at 68 Years.  Colonoscopy (SNOMED CT 767014009) performed by Leonardo Damon MD on 12/20/1999 at 65 Years.  Surgery (SNOMED CT 516757619) on 4/29/1999 at 65 Years.  Comments:  6/21/2016 9:49 AM - Hali Yepez  Right exploratory tympanotomy transcanal, atticotomy, stapedectomy with insertion of 4.0 Schuknecht piston wire prosthesis    5/4/2010 1:32 PM - Kamala Proctor LPN  right ear   Social History:        Alcohol Assessment            Never      Tobacco Assessment            Never      Substance Abuse Assessment            Never      Employment and Education Assessment            Retired, Work/School description: [employer].   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      Home and Environment Assessment            Marital status: .  2 children.      Exercise and Physical Activity Assessment            Exercise type: Running.        Physical Examination   Vital signs reviewed  and within acceptable limits    Vital Signs   6/19/2017 10:22 AM CDT Peripheral Pulse Rate 68 bpm    Pulse Site Radial artery    HR Method Manual    Systolic Blood Pressure 130 mmHg    Diastolic Blood Pressure 72 mmHg    Mean Arterial Pressure 91 mmHg    BP Site Left arm    BP Method Electronic      Measurements from flowsheet : Measurements   6/19/2017 10:22 AM CDT Height Measured - Standard 66.5 in    Weight Measured - Standard 207.6 lb    BSA  2.1 m2    Body Mass Index 33 kg/m2      General:  No acute distress.    Neck:  Supple, No lymphadenopathy, No thyromegaly.    Respiratory:  Lungs are clear to auscultation, Respirations are non-labored, Breath sounds are equal, Symmetrical chest wall expansion.    Cardiovascular:  Normal rate, No murmur, No gallop, Good pulses equal in all extremities, Normal peripheral perfusion, No edema, irregular rythm.    Gastrointestinal:  Soft, Non-tender, Non-distended, Normal bowel sounds, No organomegaly.    Integumentary:  Warm, Dry, Pink, No pedal skin lesions.    Neurologic:  Alert, Oriented, normal sensation to monofilament.    Psychiatric:  Cooperative, Appropriate mood & affect.       Review / Management   Results review:  Lab results   6/7/2017 8:34 AM CDT Sodium Level 139 mmol/L    Potassium Level 3.9 mmol/L    Chloride Level 106 mmol/L    CO2 Level 24 mmol/L    Glucose Level 104 mg/dL  HI    BUN 19 mg/dL    Creatinine Level 1.26 mg/dL  HI    BUN/Creat Ratio 15    eGFR 52 mL/min/1.73m2  LOW    eGFR African American 61 mL/min/1.73m2    Calcium Level 9.6 mg/dL    Bilirubin Total 0.6 mg/dL    Alkaline Phosphatase 51 unit/L    AST/SGOT 18 unit/L    ALT/SGPT 12 unit/L    Protein Total 6.7 gm/dL    Albumin Level 3.9 gm/dL    Globulin 2.8    A/G Ratio 1.4    Cholesterol 176 mg/dL    Non-HDL Cholesterol 140    HDL 36 mg/dL  LOW    Cholesterol/HDL Ratio 4.9        Triglyceride 161 mg/dL  HI    U Microalbumin 3.6 mg/dL    Microalbumin Comment See comment    WBC 6.3    RBC 4.18  LOW    Hgb 14.1 gm/dL    Hct 40.7 %    MCV 97.4 fL    MCH 33.7 pg  HI    MCHC 34.6 gm/dL    RDW 12.5 %    Platelet 142    MPV 12.3 fL   .

## 2022-02-16 NOTE — TELEPHONE ENCOUNTER
---------------------  From: Vivian Adam CMA   To: Chaz Fair MD;     Sent: 2019 3:02:40 PM CDT  Subject: morphine     Mariana at [facility] called to say that she could only fill 18 tabs of Morphine due to a backorder.. They are not sure that pt will have enough for the weekend..... please advise

## 2022-02-16 NOTE — PROGRESS NOTES
Patient:   TREVOR DWYER            MRN: [id number]            FIN: [id number]               Age:   83 years     Sex:  Male     :  1934   Associated Diagnoses:   Contusion of right hip   Author:   Chaz Fair MD      Impression and Plan   Diagnosis     Contusion of right hip (CFN30-JT S70.01XA).     Course:  Worsening.    Plan:  RICE  Follow up in 5 days if not significantly better.    Orders     Orders   Charges (Evaluation and Management):  53959 office outpatient visit 15 minutes (Charge) (Order): Quantity: 1, Contusion of right hip.        Visit Information   Visit type:  New symptom.    Accompanied by:  Spouse.    Source of history:  Self, Spouse.    History limitation:  None.       Chief Complaint   2017 9:21 AM CST   Pt here for fall last night        History of Present Illness             The patient presents with hip pain, hip injury.  The hip pain is located on the right.  The hip pain is described as aching.  The severity of the hip pain is moderate.  The hip pain is constant.  The hip pain has lasted for 12 hour(s).  Radiation of pain none.  The context of the hip pain: occurred after a fall.  There are no modifying factors.  Associated symptoms consist of frequent falls and gait disturbance.        Review of Systems   Constitutional:  Negative.    Eye:  Negative.    Ear/Nose/Mouth/Throat:  Negative.    Respiratory:  Negative.    Cardiovascular:  Negative.    Gastrointestinal:  Negative.    Genitourinary:  Negative.    Hematology/Lymphatics:  Negative.    Endocrine:  Negative.    Immunologic:  Negative.    Musculoskeletal:  Negative except as documented in history of present illness.    Integumentary:  Negative.    Neurologic:  Negative.    Psychiatric:  Negative.    All other systems reviewed and negative      Health Status   Allergies:    Allergic Reactions (Selected)  Severity Not Documented  Lisinopril (Elevated creatinine)  Nonallergic Reactions (Selected)  Severity Not  Documented  Aricept (Ill)   Medications:  (Selected)   Prescriptions  Prescribed  Metoprolol Tartrate 25 mg oral tablet: ( 12.5 mg ), po, bid, # 90 tab(s), 1 Refill(s), Type: Maintenance  Miscellaneous Rx Supply: 1 Touch Ultra test Strips, See Instructions, Instructions: BID, Supply, # 180 EA, 1 Refill(s), Type: Maintenance  One Touch Ultra Mini Meter: One Touch Ultra Mini Meter, See Instructions, Instructions: Use as directed., Supply, # 1 kit(s), 0 Refill(s), Type: Maintenance  Vitamin B12 1000 mcg oral tablet: ( 1,000 mcg ), po, daily, # 90 tab(s), 1 Refill(s), Type: Maintenance  aspirin 81 mg oral tablet: 1 tab(s) ( 81 mg ), po, daily, # 90 tab(s), 1 Refill(s), Type: Maintenance, faxed to pharmacy (Rx), [facility]# [phone] '; phonw # [phone]  hydroCHLOROthiazide 25 mg oral tablet: 0.5 tab(s) ( 12.5 mg ), PO, Daily, # 45 tab(s), 1 Refill(s), Type: Maintenance  metFORMIN 500 mg oral tablet, extended release: 1 tab(s) ( 500 mg ), PO, bid, # 180 tab(s), 1 Refill(s), Type: Maintenance  nitroglycerin 0.4 mg sublingual tablet: See Instructions, Instructions: 1 tab(s) SL q5min  (not to exceed 3 doses/15 min--if pain persists, seek medical attention), PRN:  for chest pain, # 25 tab(s), 1 Refill(s), Type: Maintenance  warfarin 1 mg oral tablet: See Instructions, Instructions: as directed, # 100 tab(s), 1 Refill(s), Type: Maintenance  warfarin 3 mg oral tablet: See Instructions, Instructions: Take one (1) tablet daily as directed by physician, # 60 EA, 5 Refill(s), Type: Maintenance, Pharmacy: [facility], Take one (1) tablet daily as directed by physician  Documented Medications  Documented  Multi-vitamin: Multi-vitamin, See Instructions, Supply, 0 Refill(s), Type: Maintenance  finasteride 5 mg oral tablet: 1 tab(s) ( 5 mg ), po, daily, 0 Refill(s), Type: Maintenance  tamsulosin 0.4 mg oral capsule: 1 cap(s) ( 0.4 mg ), po, daily, 0 Refill(s), Type: Maintenance   Problem list:    All  Problems (Selected)  Acute gout / SNOMED CT 961026226 / Confirmed  ATRIAL FIBRILLATION / SNOMED CT 2187123964 / Confirmed  BPH (benign prostatic hyperplasia) / SNOMED CT 640000752 / Confirmed  CAD (Coronary Artery Disease) / ICD-9-.00 / Confirmed  Cardiac pacemaker in situ / SNOMED CT 5678329365 / Confirmed  Dementia / SNOMED CT 220492508114562 / Confirmed  Diabetes Mellitus / ICD-9- / Confirmed  Dyslipidemia / ICD-9-.4 / Confirmed  History of fracture of rib / SNOMED CT 2853578519 / Confirmed  HTN (hypertension) / SNOMED CT 2767005920 / Confirmed  Memory Loss or Impairment / ICD-9-.93 / Confirmed  MI (Myocardial Infarction) / ICD-9-.90 / Confirmed  Obesity / ICD-9-.00 / Probable  Onychomycosis / SNOMED CT 6480450471 / Confirmed  JAZLYN (Obstructive Sleep Apnea) / ICD-9-.23 / Confirmed  Transient ischemic attack (TIA) / SNOMED CT 729535492 / Confirmed      Histories   Past Medical History:    Active  Cardiac pacemaker in situ (7956922371): Onset in 2008 at 74 years.  History of fracture of rib (6146190503): Onset in the month of 10/1997 at 63 years  Comments:  6/21/2016 CDT 9:44 AM CDT - Hali Yepez  Left 11th rib  ATRIAL FIBRILLATION (9706008604)  CAD (Coronary Artery Disease) (414.00)  MI (Myocardial Infarction) (410.90)  Comments:  5/4/2010 CDT 1:24 PM CDT - Kamala Proctor LPN  subendocardial  HTN (hypertension) (5137527420)  Dyslipidemia (272.4)  JAZLYN (Obstructive Sleep Apnea) (327.23)  Obesity (278.00)  Resolved  Inpatient stay (409636168): Onset on 1/8/2017 at 82 years.  Resolved on 1/10/2017 at 82 years.  Comments:  1/16/2017 CST 4:59 PM Hali Tran  @[facility] TIA  Inpatient stay (780314778): Onset on 3/22/2014 at 80 years.  Resolved.  Comments:  1/16/2017 CST 5:00 PM Hali Tran  @[place] - Chest pain  Inpatient stay (205240539): Onset on 6/12/2010 at 76 years.  Resolved.  Comments:  1/16/2017 CST 4:59 PM ELLIOT - Hali Yepez  @[facility]    Family History:    Cancer  Father ()  Comments:  2010 8:56 AM - Kapil  Apple  Bladder CA  CA - Cancer of colon  Mother ()     Procedure history:    Colonoscopy (SNOMED CT 295358603) performed by Boni Garcia on 2015 at 81 Years.  Comments:  2015 10:48 AM - Debi Palmer RN  Sedation: MAC  Indication: positive cologuard, personal history of colon polyps, family history of colon cancer  Diverticulosis in the sigmoid & descending colon.  Repeat only if medically indicated.  Screening for malignant neoplasm of colon (SNOMED CT 7958632255) on 3/10/2015 at 81 Years.  Comments:  3/19/2015 10:41 AM - Marisela Boss  Cologuard is positive.  Colonoscopy on 3/8/2012 at 78 Years.  Comments:  2014 9:43 AM - Kayla Win CMA  Colonoscopy with Dr Matamoros  Sedation: MAC  Colonoscopy (SNOMED CT 378730544) performed by Jeronimo Matamoros MD on 2009 at 75 Years.  Pacemaker catheter, device (SNOMED CT 9811945774) on 10/15/2008 at 74 Years.  Stenting of pulmonary vein (SNOMED CT 581606284) in  at 70 Years.  Colonoscopy (SNOMED CT 386567661) in  at 68 Years.  Colonoscopy (SNOMED CT 783707874) performed by Leonardo Damon MD on 1999 at 65 Years.  Surgery (SNOMED CT 642497155) on 1999 at 65 Years.  Comments:  2016 9:49 AM - Hali Yepez  Right exploratory tympanotomy transcanal, atticotomy, stapedectomy with insertion of 4.0 Schuknecht piston wire prosthesis    2010 1:32 PM - Kamala Proctor LPN  right ear      Physical Examination   Vital Signs   2017 9:21 AM CST Peripheral Pulse Rate 75 bpm    Systolic Blood Pressure 134 mmHg  HI    Diastolic Blood Pressure 82 mmHg  HI    Mean Arterial Pressure 99 mmHg    BP Site Left arm    Oxygen Saturation 98 %      Measurements from flowsheet : Measurements   2017 9:21 AM CST   Ht/Wt Measurement Refused by Patient?     Yes     General:  Alert and oriented X 3, No acute distress, Warm, Pink, Intact.          Appearance: Within normal limits, Well nourished, Calm.         Hydration: Within normal limits.         Psych: Within normal limits, Appropriate mood and affect, Cooperative, Normal judgment.    Musculoskeletal:       Lower extremity exam: Hip ( Right, Not displaced, No deformity, No erythema, No ecchymosis, No mass, No nodule, No swelling, Tenderness, No wound, No numbness, Strength  5 /5, Normal range of motion, able to bear weight and ambulate with aid of walker ).

## 2022-02-16 NOTE — TELEPHONE ENCOUNTER
---------------------  From: Vivian Adam CMA   To: Chaz Fair MD;     Sent: 6/17/2019 11:29:56 AM CDT  Subject: General Message     Phone Message    Note:   WIfe, Milagro, called to say that today she will be increasing morphine to 1 tab BID but pt is still complaining all day long of pain and being uncomfortable....also, yesterday he slept all day long again... wife if looking for guidance... [facility] and is just wondering if NANCY would like them to address this or NANCY will?          Person Calling:  Milagro  Phone number:  [phone]        PCP:   NANCY    Time of Call:  _    Last office visit and reason:  _    Transferred to: _---------------------  From: Chaz Fair MD   To: Vivian Adam CMA;     Sent: 6/17/2019 12:04:43 PM CDT  Subject: RE: General Message     Tell her I think she should go ahead with one tablet twice daily.  If it does not control the pain and he continues to sleep all day we will next try a different narcotic pain killer.Wife notified and voiced understanding

## 2022-02-16 NOTE — PROGRESS NOTES
Patient:   TREVOR DWYER            MRN: [id number]            FIN: [id number]               Age:   83 years     Sex:  Male     :  1934   Associated Diagnoses:   Acute gout   Author:   Chaz Fair MD      Impression and Plan   Diagnosis     Acute gout (FKE46-LO M10.9).     Course:  Worsening.    Orders     Orders   Charges (Evaluation and Management):  54856 office outpatient visit 15 minutes (Charge) (Order): Quantity: 1, Acute gout.     Orders (Selected)   Prescriptions  Prescribed  predniSONE 20 mg oral tablet: 1 tab(s) ( 20 mg ), PO, bid, # 14 tab(s), 0 Refill(s), Type: Maintenance, Pharmacy: [facility], 1 tab(s) po bid,x7 day(s).        Visit Information   Visit type:  New symptom.    Accompanied by:  Spouse.    Source of history:  Self, Spouse.    History limitation:  None.       Chief Complaint   Chief complaint discussed and confirmed correct.     2017 11:28 AM CST  Pt here for red inflammed right foot        Interval History   Gout   Prn use of analgesics increased.  The course is worsening.  The effect on daily activities is change in ambulation and change in sleeping patterns.  Associated symptoms characterized by decreased range of motion, joint redness, joint stiffness and joint warmth.        Review of Systems   Constitutional:  Negative.    Eye:  Negative.    Ear/Nose/Mouth/Throat:  Negative.    Respiratory:  Negative.    Cardiovascular:  Negative.    Gastrointestinal:  Negative.    Genitourinary:  Negative.    Hematology/Lymphatics:  Negative.    Endocrine:  Negative.    Immunologic:  Negative.    Musculoskeletal:  Negative except as documented in history of present illness.    Integumentary:  Negative.    Neurologic:  Negative.    Psychiatric:  Negative.    All other systems reviewed and negative      Health Status   Allergies:    Allergic Reactions (Selected)  Severity Not Documented  Lisinopril (Elevated creatinine)  Nonallergic Reactions (Selected)  Severity Not  Documented  Aricept (Ill)   Medications:  (Selected)   Prescriptions  Prescribed  Metoprolol Tartrate 25 mg oral tablet: ( 12.5 mg ), po, bid, # 90 tab(s), 1 Refill(s), Type: Maintenance  Miscellaneous Rx Supply: 1 Touch Ultra test Strips, See Instructions, Instructions: BID, Supply, # 180 EA, 1 Refill(s), Type: Maintenance  One Touch Ultra Mini Meter: One Touch Ultra Mini Meter, See Instructions, Instructions: Use as directed., Supply, # 1 kit(s), 0 Refill(s), Type: Maintenance  Vitamin B12 1000 mcg oral tablet: ( 1,000 mcg ), po, daily, # 90 tab(s), 1 Refill(s), Type: Maintenance  aspirin 81 mg oral tablet: 1 tab(s) ( 81 mg ), po, daily, # 90 tab(s), 1 Refill(s), Type: Maintenance, faxed to pharmacy (Rx), [facility]; fax# [phone] '; phonw # [phone]  hydroCHLOROthiazide 25 mg oral tablet: 0.5 tab(s) ( 12.5 mg ), PO, Daily, # 45 tab(s), 1 Refill(s), Type: Maintenance  metFORMIN 500 mg oral tablet, extended release: 1 tab(s) ( 500 mg ), PO, bid, # 180 tab(s), 1 Refill(s), Type: Maintenance  nitroglycerin 0.4 mg sublingual tablet: See Instructions, Instructions: 1 tab(s) SL q5min  (not to exceed 3 doses/15 min--if pain persists, seek medical attention), PRN:  for chest pain, # 25 tab(s), 1 Refill(s), Type: Maintenance  predniSONE 20 mg oral tablet: 1 tab(s) ( 20 mg ), PO, bid, # 14 tab(s), 0 Refill(s), Type: Maintenance, Pharmacy: [facility], 1 tab(s) po bid,x7 day(s)  warfarin 1 mg oral tablet: See Instructions, Instructions: as directed, # 100 tab(s), 1 Refill(s), Type: Maintenance  warfarin 3 mg oral tablet: See Instructions, Instructions: Take one (1) tablet daily as directed by physician, # 60 EA, 5 Refill(s), Type: Maintenance, Pharmacy: [facility], Take one (1) tablet daily as directed by physician  Documented Medications  Documented  Multi-vitamin: Multi-vitamin, See Instructions, Supply, 0 Refill(s), Type: Maintenance  finasteride 5 mg oral tablet: 1 tab(s) ( 5 mg ), po,  daily, 0 Refill(s), Type: Maintenance  tamsulosin 0.4 mg oral capsule: 1 cap(s) ( 0.4 mg ), po, daily, 0 Refill(s), Type: Maintenance   Problem list:    All Problems  Acute gout / SNOMED CT 166332573 / Confirmed  ATRIAL FIBRILLATION / SNOMED CT 5938754905 / Confirmed  BPH (benign prostatic hyperplasia) / SNOMED CT 535257707 / Confirmed  CAD (Coronary Artery Disease) / ICD-9-.00 / Confirmed  Cardiac pacemaker in situ / SNOMED CT 8377408780 / Confirmed  Dementia / SNOMED CT 526226644586165 / Confirmed  Diabetes Mellitus / ICD-9- / Confirmed  Dyslipidemia / ICD-9-.4 / Confirmed  History of fracture of rib / SNOMED CT 3552260973 / Confirmed  HTN (hypertension) / SNOMED CT 9543052650 / Confirmed  Memory Loss or Impairment / ICD-9-.93 / Confirmed  MI (Myocardial Infarction) / ICD-9-.90 / Confirmed  Obesity / ICD-9-.00 / Probable  Onychomycosis / SNOMED CT 2667283596 / Confirmed  JAZLYN (Obstructive Sleep Apnea) / ICD-9-.23 / Confirmed  Transient ischemic attack (TIA) / SNOMED CT 554269364 / Confirmed  Resolved: Inpatient stay / SNOMED CT 709607308  Resolved: Inpatient stay / SNOMED CT 078958066  Resolved: Inpatient stay / SNOMED CT 598601518      Histories   Past Medical History:    Active  Cardiac pacemaker in situ (3520686449): Onset in 2008 at 74 years.  History of fracture of rib (3019487821): Onset in the month of 10/1997 at 63 years  Comments:  6/21/2016 CDT 9:44 AM CDT - Hali Yepez  Left 11th rib  ATRIAL FIBRILLATION (2959455133)  CAD (Coronary Artery Disease) (414.00)  MI (Myocardial Infarction) (410.90)  Comments:  5/4/2010 CDT 1:24 PM CDT - Kamala Proctor LPN  subendocardial  HTN (hypertension) (5473871892)  Dyslipidemia (272.4)  JAZLYN (Obstructive Sleep Apnea) (327.23)  Obesity (278.00)  Resolved  Inpatient stay (950112978): Onset on 1/8/2017 at 82 years.  Resolved on 1/10/2017 at 82 years.  Comments:  1/16/2017 CST 4:59 PM ELLIOT - Hali Yepez  @[facility]  Inpatient  stay (778097004): Onset on 3/22/2014 at 80 years.  Resolved.  Comments:  2017 CST 5:00 PM CST - Hali Yepez  @[place] - Chest pain  Inpatient stay (160655474): Onset on 2010 at 76 years.  Resolved.  Comments:  2017 CST 4:59 PM ELLIOT - Hali Yepez  @[facility]   Family History:    Cancer  Father ()  Comments:  2010 8:56 AM - Apple Dick  Bladder CA  CA - Cancer of colon  Mother ()     Procedure history:    Colonoscopy (SNOMED CT 920254513) performed by Boni Garcia on 2015 at 81 Years.  Comments:  2015 10:48 AM - Debi Palmer RN  Sedation: MAC  Indication: positive cologuard, personal history of colon polyps, family history of colon cancer  Diverticulosis in the sigmoid & descending colon.  Repeat only if medically indicated.  Screening for malignant neoplasm of colon (SNOMED CT 2197643301) on 3/10/2015 at 81 Years.  Comments:  3/19/2015 10:41 AM - Marisela Boss  Cologuard is positive.  Colonoscopy on 3/8/2012 at 78 Years.  Comments:  2014 9:43 AM - Kayla Win CMA  Colonoscopy with Dr Matamoros  Sedation: MAC  Colonoscopy (SNOMED CT 240028482) performed by Jeronimo Matamoros MD on 2009 at 75 Years.  Pacemaker catheter, device (SNOMED CT 1966504031) on 10/15/2008 at 74 Years.  Stenting of pulmonary vein (SNOMED CT 955494109) in  at 70 Years.  Colonoscopy (SNOMED CT 21935) in  at 68 Years.  Colonoscopy (SNOMED CT 423236014) performed by Leonardo Damon MD on 1999 at 65 Years.  Surgery (SNOMED CT 991886976) on 1999 at 65 Years.  Comments:  2016 9:49 AM - Hali Yepez  Right exploratory tympanotomy transcanal, atticotomy, stapedectomy with insertion of 4.0 Schuknecht piston wire prosthesis    2010 1:32 PM - Kamala Proctor LPN  right ear   Social History:        Alcohol Assessment            Never      Tobacco Assessment            Never      Substance Abuse Assessment            Never      Employment  and Education Assessment            Retired, Work/School description: [employer].   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      Home and Environment Assessment            Marital status: .  2 children.      Exercise and Physical Activity Assessment            Exercise type: Running.        Physical Examination   Vital signs reviewed  and within acceptable limits    Vital Signs   11/21/2017 11:28 AM CST Peripheral Pulse Rate 55 bpm  LOW    Systolic Blood Pressure 112 mmHg    Diastolic Blood Pressure 84 mmHg    Mean Arterial Pressure 93 mmHg    BP Site Left arm      Measurements from flowsheet : Measurements   11/21/2017 11:28 AM CST Height Measured - Standard 66.5 in    Weight Measured - Standard 205.8 lb    BSA 2.09 m2    Body Mass Index 32.72 kg/m2      General:  Alert and oriented X 3, No acute distress, Warm, Pink, Intact.         Appearance: Within normal limits, Well nourished, Calm.         Hydration: Within normal limits.         Psych: Within normal limits, Appropriate mood and affect, Cooperative, Normal judgment.    Musculoskeletal:  right 5th MTP joint red swollen and tender.   Strong peripheral pulses

## 2022-02-16 NOTE — PROGRESS NOTES
Patient:   TREVOR DWYER            MRN: [id number]            FIN: [id number]               Age:   82 years     Sex:  Male     :  1934   Associated Diagnoses:   Impacted cerumen   Author:   Chaz Fair MD      Impression and Plan   Diagnosis     Impacted cerumen (ZJH48-IQ H61.20).     Orders     Orders (Selected)   Outpatient Orders  Order  [id number] rmvl impacted cerumen spx 1/both ears (Charge): Quantity: 1, Impacted cerumen.        Procedure   Ear foreign body removal procedure   Date/ Time:  2017 1:46:00 PM.     Confirmed: patient, procedure, side, safety procedures followed.     Performed by: Chaz Fair MD.     Informed consent: verbal consent given by patient.     Indication: ear fullness, hearing disturbance.     Location: left ear, right ear.     Preparation and technique: positioned sitting upright, method including (cerumen loop, irrigation with warm tap water, otologic syringe).     Results: foreign body removal complete.     Procedure tolerated: well.

## 2022-02-16 NOTE — PROGRESS NOTES
Patient:   TREVOR DWYER            MRN: [id number]            FIN: [id number]               Age:   83 years     Sex:  Male     :  1934   Associated Diagnoses:   Dysuria   Author:   Chaz Fair MD      Impression and Plan   Diagnosis     Dysuria (ECY17-PD R30.0).     Course:  Worsening.    Summary:  care giver refuses urology consult or Tamsulosin for BPH.    Orders     Orders   Charges (Evaluation and Management):  [id number] office outpatient visit 15 minutes (Charge) (Order): Quantity: 1, Dysuria.     Orders (Selected)   Outpatient Orders  Ordered (Dispatched)  Culture, Urine, Routine* ([facility]): Specimen Type: Urine (Clean Catch), Collection Date: 17 11:14:00 CDT  Urinalysis, Complete* ([facility]): Specimen Type: Urine, Collection Date: 17 11:14:00 CDT  Prescriptions  Prescribed  Macrobid 100 mg oral capsule: 1 cap(s) ( 100 mg ), PO, BID, # 14 cap(s), 0 Refill(s), Type: Maintenance, Pharmacy: [facility], 1 cap(s) po bid,x7 day(s).        Visit Information   Visit type:  New symptom.    Accompanied by:  No one.    Source of history:  Self.    History limitation:  None.       Chief Complaint   2017 10:51 AM CDT    Pt. here for UTI.        History of Present Illness             The patient presents with dysuria.  The dysuria is characterized by burning and sensation of incomplete bladder emptying.  The severity of the dysuria is severe.  The dysuria is constant.  The dysuria has lasted for several months.  There are no modifying factors.  Associated symptoms consist of none.        Review of Systems   Constitutional:  Negative.    Eye:  Negative.    Ear/Nose/Mouth/Throat:  Negative.    Respiratory:  Negative.    Cardiovascular:  Negative.    Gastrointestinal:  Negative.    Genitourinary:  Negative except as documented in history of present illness.    Hematology/Lymphatics:  Negative.    Endocrine:  Negative.    Immunologic:  Negative.    Musculoskeletal:  Negative.    Integumentary:   Negative.    Neurologic:  Negative.    Psychiatric:  Negative.          All other systems reviewed and negative      Health Status   Allergies:    Allergic Reactions (Selected)  Severity Not Documented  Lisinopril (Elevated creatinine)  Nonallergic Reactions (Selected)  Severity Not Documented  Aricept (Ill)   Medications:  (Selected)   Prescriptions  Prescribed  Efudex 5% topical cream: 1 julee, TOP, BID, # 40 gm, 0 Refill(s), Type: Maintenance  Macrobid 100 mg oral capsule: 1 cap(s) ( 100 mg ), PO, BID, # 14 cap(s), 0 Refill(s), Type: Maintenance, Pharmacy: [facility], 1 cap(s) po bid,x7 day(s)  Metoprolol Tartrate 25 mg oral tablet: ( 12.5 mg ), po, bid, # 90 tab(s), 1 Refill(s), Type: Maintenance  Miscellaneous Rx Supply: 1 Touch Ultra test Strips, See Instructions, Instructions: BID, Supply, # 180 EA, 1 Refill(s), Type: Maintenance  One Touch Ultra Mini Meter: One Touch Ultra Mini Meter, See Instructions, Instructions: Use as directed., Supply, # 1 kit(s), 0 Refill(s), Type: Maintenance  Vitamin B12 1000 mcg oral tablet: ( 1,000 mcg ), po, daily, # 90 tab(s), 1 Refill(s), Type: Maintenance  aspirin 81 mg oral tablet: 1 tab(s) ( 81 mg ), po, daily, # 90 tab(s), 1 Refill(s), Type: Maintenance, faxed to pharmacy (Rx), [facility]; fax# [phone] '; phonw # [phone]  hydroCHLOROthiazide 25 mg oral tablet: 0.5 tab(s) ( 12.5 mg ), PO, Daily, # 45 tab(s), 1 Refill(s), Type: Maintenance  metFORMIN 500 mg oral tablet, extended release: 1 tab(s) ( 500 mg ), PO, bid, # 180 tab(s), 1 Refill(s), Type: Maintenance  nitroglycerin 0.4 mg sublingual tablet: See Instructions, Instructions: 1 tab(s) SL q5min  (not to exceed 3 doses/15 min--if pain persists, seek medical attention), PRN:  for chest pain, # 25 tab(s), 1 Refill(s), Type: Maintenance  warfarin 3 mg oral tablet: See Instructions, Instructions: Take one (1) tablet daily as directed by physician, # 60 EA, 5 Refill(s), Type: Maintenance,  Pharmacy: [facility], Take one (1) tablet daily as directed by physician  Documented Medications  Documented  Multi-vitamin: Multi-vitamin, See Instructions, Supply, 0 Refill(s), Type: Maintenance  warfarin 1 mg oral tablet: 1 tab(s) ( 1 mg ), PO, Daily, # 90 tab(s), 0 Refill(s), Type: Maintenance   Problem list:    All Problems (Selected)  Acute gout / SNOMED CT 313276751 / Confirmed  ATRIAL FIBRILLATION / SNOMED CT 5444173782 / Confirmed  CAD (Coronary Artery Disease) / ICD-9-.00 / Confirmed  Cardiac pacemaker in situ / SNOMED CT 0480097493 / Confirmed  Dementia / SNOMED CT 364207492815878 / Confirmed  Diabetes Mellitus / ICD-9- / Confirmed  Dyslipidemia / ICD-9-.4 / Confirmed  History of fracture of rib / SNOMED CT 5933170479 / Confirmed  HTN (hypertension) / SNOMED CT 7556053691 / Confirmed  Memory Loss or Impairment / ICD-9-.93 / Confirmed  MI (Myocardial Infarction) / ICD-9-.90 / Confirmed  Obesity / ICD-9-.00 / Probable  Onychomycosis / SNOMED CT 6164784609 / Confirmed  JAZLYN (Obstructive Sleep Apnea) / ICD-9-.23 / Confirmed  Transient ischemic attack (TIA) / SNOMED CT 780388717 / Confirmed      Histories   Past Medical History:    Active  Cardiac pacemaker in situ (4318042228): Onset in 2008 at 74 years.  History of fracture of rib (5361949421): Onset in the month of 10/1997 at 63 years  Comments:  6/21/2016 CDT 9:44 AM CDT - Hali Yepez  Left 11th rib  ATRIAL FIBRILLATION (4558090906)  CAD (Coronary Artery Disease) (414.00)  MI (Myocardial Infarction) (410.90)  Comments:  5/4/2010 CDT 1:24 PM CDT - Kamala Proctor LPN  subendocardial  HTN (hypertension) (1452972984)  Dyslipidemia (272.4)  JAZLYN (Obstructive Sleep Apnea) (327.23)  Obesity (278.00)  Resolved  Inpatient stay (657190670): Onset on 1/8/2017 at 82 years.  Resolved on 1/10/2017 at 82 years.  Comments:  1/16/2017 CST 4:59 PM CST - Hali Yepez  @[facility] - TIA  Inpatient stay (628231866): Onset on  3/22/2014 at 80 years.  Resolved.  Comments:  2017 CST 5:00 PM ELLIOT - Hali Yepez  @[facility] Chest pain  Inpatient stay (083555055): Onset on 2010 at 76 years.  Resolved.  Comments:  2017 CST 4:59 PM Hali Tran  @[facility]   Family History:    Cancer  Father ()  Comments:  2010 8:56 AM - Apple Dick  Bladder CA  CA - Cancer of colon  Mother ()     Procedure history:    Colonoscopy (SNOMED CT 141351247) performed by Boni Garcia on 2015 at 81 Years.  Comments:  2015 10:48 AM - Debi Palmer RN  Sedation: MAC  Indication: positive cologuard, personal history of colon polyps, family history of colon cancer  Diverticulosis in the sigmoid & descending colon.  Repeat only if medically indicated.  Screening for malignant neoplasm of colon (SNOMED CT 7059002747) on 3/10/2015 at 81 Years.  Comments:  3/19/2015 10:41 AM - Marisela Boss  Cologuard is positive.  Colonoscopy on 3/8/2012 at 78 Years.  Comments:  2014 9:43 AM - Kayla Win CMA  Colonoscopy with Dr Matamoros  Sedation: MAC  Colonoscopy (SNOMED CT 931865651) performed by Jeronimo Matamoros MD on 2009 at 75 Years.  Pacemaker catheter, device (SNOMED CT 0564675548) on 10/15/2008 at 74 Years.  Stenting of pulmonary vein (SNOMED CT 141316146) in  at 70 Years.  Colonoscopy (SNOMED CT 437124791) in  at 68 Years.  Colonoscopy (SNOMED CT 238359858) performed by Leonardo Damon MD on 1999 at 65 Years.  Surgery (SNOMED CT 264753341) on 1999 at 65 Years.  Comments:  2016 9:49 AM - Hali Yepez  Right exploratory tympanotomy transcanal, atticotomy, stapedectomy with insertion of 4.0 Schuknecht piston wire prosthesis    2010 1:32 PM - Kamala Proctor LPN  right ear   Social History:        Alcohol Assessment            Never      Tobacco Assessment            Never      Substance Abuse Assessment            Never      Employment and Education Assessment             Retired, Work/School description: [employer].   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      Home and Environment Assessment            Marital status: .  2 children.      Exercise and Physical Activity Assessment            Exercise type: Running.        Physical Examination   Vital Signs   8/1/2017 11:15 AM CDT Temperature Tympanic 97.8 DegF  LOW   8/1/2017 10:51 AM CDT Peripheral Pulse Rate 64 bpm    Pulse Site Radial artery    HR Method Manual    Systolic Blood Pressure 126 mmHg    Diastolic Blood Pressure 72 mmHg    Mean Arterial Pressure 90 mmHg    BP Site Left arm    BP Method Manual      Measurements from flowsheet : Measurements   8/1/2017 10:51 AM CDT Height Measured - Standard 66.5 in    Weight Measured - Standard 202.8 lb    BSA 2.08 m2    Body Mass Index 32.24 kg/m2      General:  No acute distress.    Respiratory:  Lungs are clear to auscultation, Respirations are non-labored, Breath sounds are equal, Symmetrical chest wall expansion.    Cardiovascular:  Normal rate, Regular rhythm, No murmur, No gallop, Good pulses equal in all extremities, Normal peripheral perfusion, No edema.    Gastrointestinal:  Soft, Non-tender, Non-distended, Normal bowel sounds, No organomegaly.    Genitourinary:  No costovertebral angle tenderness.    Integumentary:  Warm, Dry, Pink.    Neurologic:  Alert, Oriented.    Psychiatric:  Cooperative, Appropriate mood & affect.       Review / Management   Results review:  See UA results.

## 2022-02-16 NOTE — CARE COORDINATION
Patient:   TREVOR DWYER            MRN: [id number]            FIN: [id number]               Age:   83 years     Sex:  Male     :  1934   Associated Diagnoses:   None   Author:   Lila Ventura CMA      [facility] ED Discharge Note   Sources of Information:  [ X] Patient, family member, or caregiver (Please list): Left a message   [ X] Hospital discharge summary  [ ] Hospital fax  [ ] List of recent hospitalizations or ED visits  [ ] Other:     Discharged From: [facility]   Discharge Date: 2017    Diagnosis/Problem: Hematuria     Medication Changes: [ ] Yes [X ] No   Medication List Updated: [ ] Yes [X ] No    Needs Referral or Lab: [ X] Yes [ ] No  Provider wanted him to have an urgent appointment with urology.      Recommenations for Outpatient Provider:     Needs Follow-up Appointment:  X[ ] Within 7 days of discharge (highly complex visit)  [ ] Within 14 days of discharge (moderately complex visit)    Appointment Made With: No appointment at this time.  Will talk to patient when he returns my call.    Date: patient did come in for INR as recommended.  Not returning phone calls at this time.      Additional Information Needed and Requested:  [ ] Yes:  [ ] No   Was to see Urology Specialist but not retuning calls